# Patient Record
Sex: FEMALE | Race: WHITE | HISPANIC OR LATINO | Employment: FULL TIME | URBAN - METROPOLITAN AREA
[De-identification: names, ages, dates, MRNs, and addresses within clinical notes are randomized per-mention and may not be internally consistent; named-entity substitution may affect disease eponyms.]

---

## 2017-04-25 ENCOUNTER — HOSPITAL ENCOUNTER (EMERGENCY)
Facility: MEDICAL CENTER | Age: 22
End: 2017-04-25
Attending: EMERGENCY MEDICINE
Payer: MEDICAID

## 2017-04-25 VITALS
SYSTOLIC BLOOD PRESSURE: 106 MMHG | HEART RATE: 77 BPM | RESPIRATION RATE: 16 BRPM | TEMPERATURE: 97.2 F | BODY MASS INDEX: 29.54 KG/M2 | DIASTOLIC BLOOD PRESSURE: 62 MMHG | WEIGHT: 194.89 LBS | HEIGHT: 68 IN | OXYGEN SATURATION: 98 %

## 2017-04-25 DIAGNOSIS — R51.9 NONINTRACTABLE HEADACHE, UNSPECIFIED CHRONICITY PATTERN, UNSPECIFIED HEADACHE TYPE: ICD-10-CM

## 2017-04-25 DIAGNOSIS — N93.9 VAGINAL BLEEDING: ICD-10-CM

## 2017-04-25 LAB
APPEARANCE UR: CLEAR
BILIRUB UR QL STRIP.AUTO: NEGATIVE
COLOR UR: YELLOW
CULTURE IF INDICATED INDCX: NO UA CULTURE
EPI CELLS #/AREA URNS HPF: NORMAL /HPF
GLUCOSE UR STRIP.AUTO-MCNC: NEGATIVE MG/DL
HCG UR QL: NEGATIVE
KETONES UR STRIP.AUTO-MCNC: NEGATIVE MG/DL
LEUKOCYTE ESTERASE UR QL STRIP.AUTO: NEGATIVE
MICRO URNS: ABNORMAL
NITRITE UR QL STRIP.AUTO: NEGATIVE
PH UR STRIP.AUTO: 6.5 [PH]
PROT UR QL STRIP: NEGATIVE MG/DL
RBC # URNS HPF: NORMAL /HPF
RBC UR QL AUTO: ABNORMAL
SP GR UR STRIP.AUTO: 1.03
WBC #/AREA URNS HPF: NORMAL /HPF

## 2017-04-25 PROCEDURE — 700111 HCHG RX REV CODE 636 W/ 250 OVERRIDE (IP): Performed by: EMERGENCY MEDICINE

## 2017-04-25 PROCEDURE — 81001 URINALYSIS AUTO W/SCOPE: CPT

## 2017-04-25 PROCEDURE — 96375 TX/PRO/DX INJ NEW DRUG ADDON: CPT

## 2017-04-25 PROCEDURE — 96374 THER/PROPH/DIAG INJ IV PUSH: CPT

## 2017-04-25 PROCEDURE — 700105 HCHG RX REV CODE 258: Performed by: EMERGENCY MEDICINE

## 2017-04-25 PROCEDURE — 99284 EMERGENCY DEPT VISIT MOD MDM: CPT

## 2017-04-25 PROCEDURE — 81025 URINE PREGNANCY TEST: CPT

## 2017-04-25 RX ORDER — KETOROLAC TROMETHAMINE 30 MG/ML
30 INJECTION, SOLUTION INTRAMUSCULAR; INTRAVENOUS ONCE
Status: COMPLETED | OUTPATIENT
Start: 2017-04-25 | End: 2017-04-25

## 2017-04-25 RX ORDER — SODIUM CHLORIDE 9 MG/ML
1000 INJECTION, SOLUTION INTRAVENOUS ONCE
Status: COMPLETED | OUTPATIENT
Start: 2017-04-25 | End: 2017-04-25

## 2017-04-25 RX ORDER — IBUPROFEN 600 MG/1
600 TABLET ORAL EVERY 8 HOURS PRN
Qty: 15 TAB | Refills: 0 | Status: SHIPPED | OUTPATIENT
Start: 2017-04-25 | End: 2022-08-08

## 2017-04-25 RX ORDER — DIPHENHYDRAMINE HYDROCHLORIDE 50 MG/ML
25 INJECTION INTRAMUSCULAR; INTRAVENOUS ONCE
Status: COMPLETED | OUTPATIENT
Start: 2017-04-25 | End: 2017-04-25

## 2017-04-25 RX ORDER — ONDANSETRON 2 MG/ML
4 INJECTION INTRAMUSCULAR; INTRAVENOUS ONCE
Status: COMPLETED | OUTPATIENT
Start: 2017-04-25 | End: 2017-04-25

## 2017-04-25 RX ADMIN — DIPHENHYDRAMINE HYDROCHLORIDE 25 MG: 50 INJECTION, SOLUTION INTRAMUSCULAR; INTRAVENOUS at 21:31

## 2017-04-25 RX ADMIN — ONDANSETRON 4 MG: 2 INJECTION INTRAMUSCULAR; INTRAVENOUS at 21:32

## 2017-04-25 RX ADMIN — SODIUM CHLORIDE 1000 ML: 9 INJECTION, SOLUTION INTRAVENOUS at 21:31

## 2017-04-25 RX ADMIN — KETOROLAC TROMETHAMINE 30 MG: 30 INJECTION, SOLUTION INTRAMUSCULAR; INTRAVENOUS at 21:31

## 2017-04-25 ASSESSMENT — LIFESTYLE VARIABLES: DO YOU DRINK ALCOHOL: NO

## 2017-04-25 ASSESSMENT — PAIN SCALES - GENERAL: PAINLEVEL_OUTOF10: 8

## 2017-04-25 NOTE — ED AVS SNAPSHOT
Home Care Instructions                                                                                                                Shauna Vail   MRN: [id number]    Department:  [facility], Emergency Dept   Date of Visit:  4/25/2017            [facility], Emergency Dept    [address] [place] [id number]    Phone:  [phone]      You were seen by     Nissa Rodríguez D.O.      Your Diagnosis Was     Nonintractable headache, unspecified chronicity pattern, unspecified headache type     R51       These are the medications you received during your hospitalization from 04/25/2017 1903 to 04/25/2017 2330     Date/Time Order Dose Route Action    04/25/2017 2131 NS infusion 1,000 mL 1,000 mL Intravenous New Bag    04/25/2017 2131 ketorolac (TORADOL) injection 30 mg Intravenous Given    04/25/2017 2132 ondansetron (ZOFRAN) syringe/vial injection 4 mg 4 mg Intravenous Given    04/25/2017 2131 diphenhydrAMINE (BENADRYL) injection 25 mg 25 mg Intravenous Given      Follow-up Information     1. Follow up with [facility]. Schedule an appointment as soon as possible for a visit in 3 days.    Contact information    [address] [place] [address] [phone]        2. Follow up with [facility], Emergency Dept.    Specialty:  Emergency Medicine    Why:  Please return to the ED with any worsening signs or symptoms including worsening headache, inability to tolerate anything by mouth, or passing out    Contact information    [address] [place] [id number] [phone]      Medication Information     Review all of your home medications and newly ordered medications with your primary doctor and/or pharmacist as soon as possible. Follow medication instructions as directed by your doctor and/or pharmacist.     Please keep your complete medication list with you and share with your physician. Update the information when medications are  discontinued, doses are changed, or new medications (including over-the-counter products) are added; and carry medication information at all times in the event of emergency situations.               Medication List      START taking these medications        Instructions    Morning Afternoon Evening Bedtime    ibuprofen 600 MG Tabs   Commonly known as:  MOTRIN        Take 1 Tab by mouth every 8 hours as needed.   Dose:  600 mg                          ASK your doctor about these medications        Instructions    Morning Afternoon Evening Bedtime     MG Caps        Take 100 mg by mouth 2 times a day as needed for Constipation.   Dose:  100 mg                        ferrous sulfate 325 (65 FE) MG tablet        Take 1 Tab by mouth 2 times a day, with meals.   Dose:  325 mg                        PRENATAL 1 PO        Take  by mouth.                        prenatal plus vitamin 27-1 MG Tabs tablet        Take 1 Tab by mouth every morning.   Dose:  1 Tab                             Where to Get Your Medications      You can get these medications from any pharmacy     Bring a paper prescription for each of these medications    - ibuprofen 600 MG Tabs            Procedures and tests performed during your visit     IV [place]    NURSING COMMUNICATION    POC URINE PREGNANCY    URINALYSIS CULTURE, IF INDICATED    URINE MICROSCOPIC (W/UA)        Discharge Instructions         Dolor de liseth general sin causa   (General Headache Without Cause)   Un dolor de liseth en general es un dolor o malestar que se siente en la maddie de la liseth o del ernestina. Se desconocen las causas.   CUIDADOS EN EL HOGAR   · Cumpla con los controles médicos según las indicaciones.  · [place] sólo los medicamentos que le haya indicado el médico.  · Cuando sienta dolor de liseth acuéstese en un cuarto oscuro y tranquilo.  · Lleve un registro diario para averiguar si ciertas cosas provocan jones de liseth. Por ejemplo, escriba:  · Lo que come y  andrew.  · Cuánto tiempo duerme.  · Todo cambio en la dieta o medicamentos.  · Relájese recibiendo masajes o paul otras actividades relajantes.  · Coloque hielo o calor en la liseth y el ernestina valentin lo indique zamora médico.  · DISMINUYA EL NIVEL DE ESTRÉS  · Siéntase con la espalda recta. No apriete los músculos (tensione).  · Si fuma, deje de hacerlo.  · Kelle menos alcohol.  · Consuma menos cafeína o deje de tomarla.  · Coma y duerma en horarios regulares.  · Duerma entre 7 y 9 horas o valentin le indique zamora médico.  · Mantenga las luces tenues si le molesta las luces brillantes o adelaida jones de liseth empeoran.  SOLICITE AYUDA DE INMEDIATO SI:   · El dolor de liseth empeora.  · Tiene fiebre.  · Presenta rigidez en el ernestina.  · Tiene dificultad para amor.  · Adelaida músculos están débiles o pierde el control muscular.  · Pierde equilibrio o tiene problemas para caminar.  · Siente que se desvanece (debilidad) o se desmaya.  · Tiene síntomas intensos que son diferentes a los primeros síntomas.  · Tiene problemas con los medicamentos que le recetó zamora médico.  · El medicamento no le hace efecto.  · Siente que el dolor de liseth es diferente a otros jones de liseth.  · Tiene malestar estomacal (náuseas) o (vómitos).     Esta información no tiene valentin fin reemplazar el consejo del médico. Asegúrese de hacerle al médico cualquier pregunta que tenga.     Document Released: 03/11/2013 Document Revised: 05/03/2016  [email or web address].COM Interactive Patient Education ©2016 Elsevier Inc.    Hemorragia uterina disfuncional  (Dysfunctional Uterine Bleeding)  Normalmente, los períodos menstruales comienzan en las jóvenes de entre 11 y 17 años. Un ciclo o período menstrual puede repetirse entre los 23 jose y los 35 días y dura entre 1 y 7 días. Entre los 12 y los 14 días antes de que comience el ciclo menstrual, se produce la ovulación (los ovarios producen óvulos). Cuando cuente los periodos, hágalo desde el primer día del comienzo de la hemorragia del  período anterior hasta el primer día de la hemorragia del período siguiente.  La hemorragia uterina disfuncional (anormal) es diferente del período menstrual normal. Los períodos pueden comenzar antes o después que lo habitual. Pueden ser menos abundantes, presentar coágulos, o ser más abundantes. Puede tener hemorragias entre los períodos o saltear un período o más. Puede tener hemorragia luego de mantener relaciones sexuales, después de la menopausia o faltarle el período menstrual.  CAUSAS  · Embarazo (normal, aborto espontáneo, embarazo ectópico).  · DIU (dispositivo intrauterino, anticonceptivo)  · Píldoras anticonceptivas  · Tratamiento hormonal  · La menopausia  · Infección en el cervix.  · Problemas de coagulación.  · Infecciones de la superficie interna del útero.  · Endometriosis: la superficie interna del útero se desarrolla en la pelvis y otros órganos femeninos.  · Adherencias (tejido cicatrizal) dentro del útero.  · Obesidad o severa pérdida de peso.  · Pólipos en el útero.  · Cáncer en el cérvix, la vagina o el útero.  · Quiste de ovarios o Síndrome ovárico poliquístico.  · Otras enfermedades (diabetes, enfermedad de la tiroides, etc).  · Fibromas en el útero (tumores no cancerosos).  · Problemas con las hormonas femeninas.  · Hiperplasia del endometrio: capa gruesa y células agrandadas dentro del útero.  · Medicamentos que interfieren con la ovulación.  · Radiación en la pelvis o el abdomen.  · Quimioterapia.  DIAGNÓSTICO  · El médico analizará la historia de lolita períodos menstruales, los medicamentos que dalila, los cambios en el peso corporal, el estrés de la angelita diaria y cualquier problema médico que tenga.  · El médico realizará un examen físico, incluyendo un examen pélvico.  · También le solicitará análisis para completar el diagnóstico. Ellos son:  ¨ Papanicolau.  ¨ Análisis de charles.  ¨ Cultivos para descartar infecciones.  ¨ Tomografía  computada.  ¨ Ultrasonido.  ¨ Histeroscopía.  ¨ Laparoscopía.  ¨ Resonancia magnética.  ¨ Histerosalpingografía.  ¨ Dilatación y curetaje.  ¨ Biopsia de endometrio.  TRATAMIENTO  El tratamiento dependerá de la causa de la hemorragia.. El tratamiento consiste en:  · Observación de los períodos menstruales armando algunos meses.  · Prescripción de medicamentos valentin:  ¨ Antibióticos:  ¨ Hormonas.  ¨ Píldoras anticonceptivas  · Retirar el DIU (dispositivo intrauterino, anticonceptivo).  · Cirugía:  ¨ Dilatación y curetaje (raspado y remoción de tejido de la maddie interna del útero).  ¨ Laparoscopía (examen del interior del abdomen con un tubo con saulo).  ¨ Ablación uterina (destrucción de la membrana que cubre el útero con corriente eléctrica, virgie láser, congelamiento o calor ).  ¨ Histeroscopía (examen del ernestina y el útero con un tubo con saulo).  ¨ Histerectomía (extirpación del útero).  INSTRUCCIONES PARA EL CUIDADO DOMICILIARIO  · Si el profesional que la asiste le prescribe medicamentos, tómelos toni valentin se le indicó. No cambie ni reemplace medicamentos sin consultarlo con el profesional.  · Las hemorragias de larga duración pueden traen valentin consecuencia un déficit de janelle. El profesional que lo asiste podrá prescribirle janelle. [place] ayuda a reponer el janelle que el organismo pierde luego de doni hemorragia abundante. [place] los medicamentos toni valentin se le indicó.  · No tome aspirina o medicamentos que la contengan u desde doni semana antes del período menstrual ni armando el mismo. La aspirina puede hacer que la hemorragia empeore.  · Si necesita cambiar el apósito o el tampón mas de doni vez cada 2 horas, permanezca en cama con los pies elevados y aplique doni compresa fría en la maddie baja del abdomen. Descanse todo lo que pueda hasta que la hemorragia se detenga.  · Consuma alimentos balanceados. Coma alimentos ricos en janelle. Por ejemplo:  ¨ Vegetales verdes frescos.  ¨ Cereales integrales y cereales y panes con  salvado.  ¨ Huevos.  ¨ Suellen.  ¨ Hígado.  · No trate de perder peso hasta que la hemorragia anormal se detenga y los niveles de janelle en la charles vuelvan a la normalidad. No levante pesos de más de 10 libras (4.5 kg.) ni realice actividades extenuantes mientras tenga la hemorragia.  · [place] un par de meses tome nota en un almanaque y rom el comienzo y el fin de zamora período menstrual y el tipo de sangrado (escaso, medio, abundante, gotas, coágulos o falta de período). El objetivo es que zamora médico evalúe mejor el problema.  SOLICITE ATENCIÓN MÉDICA SI:  · Debe cambiar el apósito o el tampón más de doni vez cada hora.  · Se siente mareada o débil.  · Tiene algún problema que pueda relacionarse con el medicamento que está tomando.  · Siente dolor.  · Desea retirar zamora DIU.  · Quiere suspender o cambiar lolita píldoras anticonceptivas u hormonas.  · Tiene algún tipo de hemorragia anormal mencionada anteriormente.  · Tiene más de 16 años y aún no ha tenido zamora período menstrual.  · Tiene 55 años y aún tiene períodos menstruales.  · Tiene alguno de los síntomas mencionados anteriormente.  · Aparece doni erupción cutánea.  SOLICITE ATENCIÓN MÉDICA DE INMEDIATO SI:  · La temperatura oral se eleva sin motivo por encima de 38,9° C (102° F).  · Comienza a sentir escalofríos.  · Debe cambiar el apósito o el tampón más de doni vez cada hora.  · Siente dolor abdominal.  · Pierde el conocimiento, se desmaya.     Esta información no tiene valentin fin reemplazar el consejo del médico. Asegúrese de hacerle al médico cualquier pregunta que tenga.     Document Released: 09/27/2006 Document Revised: 09/11/2013  Elsevier Interactive Patient Education ©2016 Elsevier Inc.              Patient Information     Patient Information    Following emergency treatment: all patient requiring follow-up care must return either to a private physician or a clinic if your condition worsens before you are able to obtain further medical attention, please  return to the emergency room.     Billing Information    At [facility], we work to make the billing process streamlined for our patients.  Our Representatives are here to answer any questions you may have regarding your hospital bill.  If you have insurance coverage and have supplied your insurance information to us, we will submit a claim to your insurer on your behalf.  Should you have any questions regarding your bill, we can be reached online or by phone as follows:  Online: You are able pay your bills online or live chat with our representatives about any billing questions you may have. We are here to help Monday - Friday from 8:00am to 7:30pm and 9:00am - 12:00pm on Saturdays.  Please visit [email or web address]  for more information.   Phone:  [phone] or [phone]    Please note that your emergency physician, surgeon, pathologist, radiologist, anesthesiologist, and other specialists are not employed by [facility] and will therefore bill separately for their services.  Please contact them directly for any questions concerning their bills at the numbers below:     Emergency Physician Services:  [phone]  [facility]:  [phone]  [facility]:  [phone]  [facility]:  [phone]    1. Your final bill may vary from the amount quoted upon discharge if all procedures are not complete at that time, or if your doctor has additional procedures of which we are not aware. You will receive an additional bill if you return to the Emergency Department at [facility] for suture removal regardless of the facility of which the sutures were placed.     2. Please arrange for settlement of this account at the emergency registration.    3. All self-pay accounts are due in full at the time of treatment.  If you are unable to meet this obligation then payment is expected within 4-5 days.     4. If you have had radiology studies  (CT, X-ray, Ultrasound, MRI), you have received a preliminary result during your emergency department visit. Please contact the radiology department [phone] to receive a copy of your final result. Please discuss the Final result with your primary physician or with the follow up physician provided.     Crisis Hotline:  [place] Crisis Hotline:  0-412-XXCAYZO or 1-727.308.5818  [place] Crisis Hotline:    1-979.403.7147 or 364-090-2181         ED Discharge Follow Up Questions    1. In order to provide you with very good care, we would like to follow up with a phone call in the next few days.  May we have your permission to contact you?     YES /  NO    2. What is the best phone number to call you? (       )_____-__________    3. What is the best time to call you?      Morning  /  Afternoon  /  Evening                   Patient Signature:  ____________________________________________________________    Date:  ____________________________________________________________

## 2017-04-25 NOTE — ED AVS SNAPSHOT
[email or web address] Access Code: [email or web address]  Expires: 5/25/2017 11:30 PM    Your email address is not on file at [facility] [employer].  Email Addresses are required for you to sign up for [email or web address], please contact [phone] to verify your personal information and to provide your email address prior to attempting to register for [email or web address].    Shauna Vail  [address]  [place] [address]    [employer]  A secure, online tool to manage your health information     [employer]’s [email or web address].com® is a secure, online tool that connects you to your personalized health information from the privacy of your home -- day or night - making it very easy for you to manage your healthcare. Once the activation process is completed, you can even access your medical information using the [email or web address].com david, which is available for free in the Apple David store or Google Play store.     To learn more about [email or web address], visit [email or web address]    There are two levels of access available (as shown below):   My Chart Features  [facility] Primary Care Doctor [facility]  Specialists [facility]  Urgent  Care [facility] Primary Care Doctor   Email your healthcare team securely and privately 24/7 X X X    Manage appointments: schedule your next appointment; view details of past/upcoming appointments X      Request prescription refills. X      View recent personal medical records, including lab and immunizations X X X X   View health record, including health history, allergies, medications X X X X   Read reports about your outpatient visits, procedures, consult and ER notes X X X X   See your discharge summary, which is a recap of your hospital and/or ER visit that includes your diagnosis, lab results, and care plan X X  X     How to register for [employer]:  Once your e-mail address has been verified, follow the following steps to sign up for [employer].     1. Go to  [email or web address]  2. Click on the Sign Up Now box, which takes you to the New Member Sign Up page. You  will need to provide the following information:  a. Enter your [employer] Access Code exactly as it appears at the top of this page. (You will not need to use this code after you’ve completed the sign-up process. If you do not sign up before the expiration date, you must request a new code.)   b. Enter your date of birth.   c. Enter your home email address.   d. Click Submit, and follow the next screen’s instructions.  3. Create a Education Everytimet ID. This will be your [employer] login ID and cannot be changed, so think of one that is secure and easy to remember.  4. Create a [employer] password. You can change your password at any time.  5. Enter your Password Reset Question and Answer. This can be used at a later time if you forget your password.   6. Enter your e-mail address. This allows you to receive e-mail notifications when new information is available in [employer]hen[employer]ner.  7. Click Sign Up. You can now view your health information.    For assistance activating your [employer] account, call [phone]

## 2017-04-25 NOTE — ED AVS SNAPSHOT
4/25/2017    Shauna Vail  [address]  [place] [address]    Dear Shauna:    [facility] wants to ensure your discharge home is safe and you or your loved ones have had all of your questions answered regarding your care after you leave the hospital.    Below is a list of resources and contact information should you have any questions regarding your hospital stay, follow-up instructions, or active medical symptoms.    Questions or Concerns Regarding… Contact   Medical Questions Related to Your Discharge  (7 days a week, 8am-5pm) Contact a Nurse Care Coordinator   [phone]   Medical Questions Not Related to Your Discharge  (24 hours a day / 7 days a week)  Contact the Nurse Health Line   [phone]    Medications or Discharge Instructions Refer to your discharge packet   or contact your [facility] Primary Care Provider   [phone]   Follow-up Appointment(s) Schedule your appointment via [facility]   or contact Scheduling [phone]   Billing Review your statement via [facility]  or contact Billing [phone]   Medical Records Review your records via [facility]   or contact Medical Records [phone]     You may receive a telephone call within two days of discharge. This call is to make certain you understand your discharge instructions and have the opportunity to have any questions answered. You can also easily access your medical information, test results and upcoming appointments via the [facility] free online health management tool. You can learn more and sign up at Membersuite/[facility]. For assistance setting up your [facility] account, please call [phone].    Once again, we want to ensure your discharge home is safe and that you have a clear understanding of any next steps in your care. If you have any questions or concerns, please do not hesitate to contact us, we are here for you. Thank you for choosing [facility] for your healthcare needs.    Sincerely,    Your [facility] Healthcare Team

## 2017-04-26 NOTE — DISCHARGE INSTRUCTIONS
Dolor de liseth general sin causa   (General Headache Without Cause)   Un dolor de liseth en general es un dolor o malestar que se siente en la maddie de la liseth o del ernestina. Se desconocen las causas.   CUIDADOS EN EL HOGAR   · Cumpla con los controles médicos según las indicaciones.  · [place] sólo los medicamentos que le haya indicado el médico.  · Cuando sienta dolor de liseth acuéstese en un cuarto oscuro y tranquilo.  · Lleve un registro diario para averiguar si ciertas cosas provocan jones de liseth. Por ejemplo, escriba:  · Lo que come y andrew.  · Cuánto tiempo duerme.  · Todo cambio en la dieta o medicamentos.  · Relájese recibiendo masajes o paul otras actividades relajantes.  · Coloque hielo o calor en la liseth y el ernestina valentin lo indique zamora médico.  · DISMINUYA EL NIVEL DE ESTRÉS  · Siéntase con la espalda recta. No apriete los músculos (tensione).  · Si fuma, deje de hacerlo.  · Kelle menos alcohol.  · Consuma menos cafeína o deje de tomarla.  · Coma y duerma en horarios regulares.  · Duerma entre 7 y 9 horas o valentin le indique zamora médico.  · Mantenga las luces tenues si le molesta las luces brillantes o adelaida jones de liseth empeoran.  SOLICITE AYUDA DE INMEDIATO SI:   · El dolor de liseth empeora.  · Tiene fiebre.  · Presenta rigidez en el ernestina.  · Tiene dificultad para amor.  · Adelaida músculos están débiles o pierde el control muscular.  · Pierde equilibrio o tiene problemas para caminar.  · Siente que se desvanece (debilidad) o se desmaya.  · Tiene síntomas intensos que son diferentes a los primeros síntomas.  · Tiene problemas con los medicamentos que le recetó zamora médico.  · El medicamento no le hace efecto.  · Siente que el dolor de liseth es diferente a otros jones de liseth.  · Tiene malestar estomacal (náuseas) o (vómitos).     Esta información no tiene valentin fin reemplazar el consejo del médico. Asegúrese de hacerle al médico cualquier pregunta que tenga.     Document Released: 03/11/2013  Document Revised: 05/03/2016  Intense Interactive Patient Education ©2016 [employer]    Hemorragia uterina disfuncional  (Dysfunctional Uterine Bleeding)  Normalmente, los períodos menstruales comienzan en las jóvenes de entre 11 y 17 años. Un ciclo o período menstrual puede repetirse entre los 23 jose y los 35 días y dura entre 1 y 7 días. Entre los 12 y los 14 días antes de que comience el ciclo menstrual, se produce la ovulación (los ovarios producen óvulos). Cuando cuente los periodos, hágalo desde el primer día del comienzo de la hemorragia del período anterior hasta el primer día de la hemorragia del período siguiente.  La hemorragia uterina disfuncional (anormal) es diferente del período menstrual normal. Los períodos pueden comenzar antes o después que lo habitual. Pueden ser menos abundantes, presentar coágulos, o ser más abundantes. Puede tener hemorragias entre los períodos o saltear un período o más. Puede tener hemorragia luego de mantener relaciones sexuales, después de la menopausia o faltarle el período menstrual.  CAUSAS  · Embarazo (normal, aborto espontáneo, embarazo ectópico).  · DIU (dispositivo intrauterino, anticonceptivo)  · Píldoras anticonceptivas  · Tratamiento hormonal  · La menopausia  · Infección en el cervix.  · Problemas de coagulación.  · Infecciones de la superficie interna del útero.  · Endometriosis: la superficie interna del útero se desarrolla en la pelvis y otros órganos femeninos.  · Adherencias (tejido cicatrizal) dentro del útero.  · Obesidad o severa pérdida de peso.  · Pólipos en el útero.  · Cáncer en el cérvix, la vagina o el útero.  · Quiste de ovarios o Síndrome ovárico poliquístico.  · Otras enfermedades (diabetes, enfermedad de la tiroides, etc).  · Fibromas en el útero (tumores no cancerosos).  · Problemas con las hormonas femeninas.  · Hiperplasia del endometrio: capa gruesa y células agrandadas dentro del útero.  · Medicamentos que interfieren con la  ovulación.  · Radiación en la pelvis o el abdomen.  · Quimioterapia.  DIAGNÓSTICO  · El médico analizará la historia de lolita períodos menstruales, los medicamentos que dalila, los cambios en el peso corporal, el estrés de la angelita diaria y cualquier problema médico que tenga.  · El médico realizará un examen físico, incluyendo un examen pélvico.  · También le solicitará análisis para completar el diagnóstico. Ellos son:  ¨ Papanicolau.  ¨ Análisis de charles.  ¨ Cultivos para descartar infecciones.  ¨ Tomografía computada.  ¨ Ultrasonido.  ¨ Histeroscopía.  ¨ Laparoscopía.  ¨ Resonancia magnética.  ¨ Histerosalpingografía.  ¨ Dilatación y curetaje.  ¨ Biopsia de endometrio.  TRATAMIENTO  El tratamiento dependerá de la causa de la hemorragia.. El tratamiento consiste en:  · Observación de los períodos menstruales armando algunos meses.  · Prescripción de medicamentos valentin:  ¨ Antibióticos:  ¨ Hormonas.  ¨ Píldoras anticonceptivas  · Retirar el DIU (dispositivo intrauterino, anticonceptivo).  · Cirugía:  ¨ Dilatación y curetaje (raspado y remoción de tejido de la maddie interna del útero).  ¨ Laparoscopía (examen del interior del abdomen con un tubo con saulo).  ¨ Ablación uterina (destrucción de la membrana que cubre el útero con corriente eléctrica, virgie láser, congelamiento o calor ).  ¨ Histeroscopía (examen del ernestina y el útero con un tubo con saulo).  ¨ Histerectomía (extirpación del útero).  INSTRUCCIONES PARA EL CUIDADO DOMICILIARIO  · Si el profesional que la asiste le prescribe medicamentos, tómelos tnoi valentin se le indicó. No cambie ni reemplace medicamentos sin consultarlo con el profesional.  · Las hemorragias de larga duración pueden traen valentin consecuencia un déficit de janelle. El profesional que lo asiste podrá prescribirle janelle. [place] ayuda a reponer el janelle que el organismo pierde luego de doni hemorragia abundante. [place] los medicamentos toni valentin se le indicó.  · No tome aspirina o medicamentos que la  contengan u desde doni semana antes del período menstrual ni armando el mismo. La aspirina puede hacer que la hemorragia empeore.  · Si necesita cambiar el apósito o el tampón mas de doni vez cada 2 horas, permanezca en cama con los pies elevados y aplique doni compresa fría en la maddie baja del abdomen. Descanse todo lo que pueda hasta que la hemorragia se detenga.  · Consuma alimentos balanceados. Coma alimentos ricos en janelle. Por ejemplo:  ¨ Vegetales verdes frescos.  ¨ Cereales integrales y cereales y panes con salvado.  ¨ Huevos.  ¨ Suellen.  ¨ Hígado.  · No trate de perder peso hasta que la hemorragia anormal se detenga y los niveles de janelle en la charles vuelvan a la normalidad. No levante pesos de más de 10 libras (4.5 kg.) ni realice actividades extenuantes mientras tenga la hemorragia.  · Armando un par de meses tome nota en un almanaque y rom el comienzo y el fin de zamora período menstrual y el tipo de sangrado (escaso, medio, abundante, gotas, coágulos o falta de período). El objetivo es que zamora médico evalúe mejor el problema.  SOLICITE ATENCIÓN MÉDICA SI:  · Debe cambiar el apósito o el tampón más de doni vez cada hora.  · Se siente mareada o débil.  · Tiene algún problema que pueda relacionarse con el medicamento que está tomando.  · Siente dolor.  · Desea retirar zamora DIU.  · Quiere suspender o cambiar lolita píldoras anticonceptivas u hormonas.  · Tiene algún tipo de hemorragia anormal mencionada anteriormente.  · Tiene más de 16 años y aún no ha tenido zamora período menstrual.  · Tiene 55 años y aún tiene períodos menstruales.  · Tiene alguno de los síntomas mencionados anteriormente.  · Aparece doni erupción cutánea.  SOLICITE ATENCIÓN MÉDICA DE INMEDIATO SI:  · La temperatura oral se eleva sin motivo por encima de 38,9° C (102° F).  · Comienza a sentir escalofríos.  · Debe cambiar el apósito o el tampón más de doni vez cada hora.  · Siente dolor abdominal.  · Pierde el conocimiento, se desmaya.     Esta  información no tiene valentin fin reemplazar el consejo del médico. Asegúrese de hacerle al médico cualquier pregunta que tenga.     Document Released: 09/27/2006 Document Revised: 09/11/2013  Elsevier Interactive Patient Education ©2016 [employer] Inc.

## 2017-04-26 NOTE — ED NOTES
Patient given discharge paperwork and verbalized understanding. Patient out of ED ambulatory with significant other. Patient in stable condition and vitals stable and no distress noted.

## 2017-04-26 NOTE — ED NOTES
Pt ambulatory to triage; primarily Erit[place]an speaking,  and baby with patient:    Chief Complaint   Patient presents with   • Headache     intermittent HA and eye pain x1 week.   • Nausea/Vomiting/Diarrhea     x1 week, unable to keep food down for 1 week.     • Vaginal Bleeding     fould smell, white discharge, blood x 1 month.     Explained wait time and triage process to pt. Pt placed back out in lobby, told to notify ED tech or triage RN of any changes.

## 2017-04-26 NOTE — ED NOTES
Assumed care of patient. Patient complains of headache with photophobia, nausea and vomiting x one week.  Patient alert and oriented x 4. Patient denies any other complaints at this time. Patient side rails up x 1 and call bell within reach.

## 2018-11-28 ENCOUNTER — OFFICE VISIT (OUTPATIENT)
Dept: URGENT CARE | Facility: CLINIC | Age: 23
End: 2018-11-28
Payer: COMMERCIAL

## 2018-11-28 VITALS
WEIGHT: 250 LBS | HEIGHT: 67 IN | HEART RATE: 85 BPM | BODY MASS INDEX: 39.24 KG/M2 | OXYGEN SATURATION: 96 % | TEMPERATURE: 98.5 F | SYSTOLIC BLOOD PRESSURE: 104 MMHG | RESPIRATION RATE: 16 BRPM | DIASTOLIC BLOOD PRESSURE: 74 MMHG

## 2018-11-28 DIAGNOSIS — H66.002 ACUTE SUPPURATIVE OTITIS MEDIA OF LEFT EAR WITHOUT SPONTANEOUS RUPTURE OF TYMPANIC MEMBRANE, RECURRENCE NOT SPECIFIED: ICD-10-CM

## 2018-11-28 DIAGNOSIS — H60.502 ACUTE OTITIS EXTERNA OF LEFT EAR, UNSPECIFIED TYPE: ICD-10-CM

## 2018-11-28 PROCEDURE — 99203 OFFICE O/P NEW LOW 30 MIN: CPT | Performed by: PHYSICIAN ASSISTANT

## 2018-11-28 RX ORDER — AMOXICILLIN AND CLAVULANATE POTASSIUM 875; 125 MG/1; MG/1
TABLET, FILM COATED ORAL
Refills: 0 | COMMUNITY
Start: 2018-10-29 | End: 2022-08-08

## 2018-11-28 RX ORDER — AMOXICILLIN AND CLAVULANATE POTASSIUM 875; 125 MG/1; MG/1
1 TABLET, FILM COATED ORAL 2 TIMES DAILY
Qty: 14 TAB | Refills: 0 | Status: SHIPPED | OUTPATIENT
Start: 2018-11-28 | End: 2018-12-05

## 2018-11-28 NOTE — LETTER
November 28, 2018         Patient: Shauna Vail   YOB: 1995   Date of Visit: 11/28/2018           To Whom it May Concern:    Shauna Vail was seen in my clinic on 11/28/2018. She may return to work on 11/29/2018    If you have any questions or concerns, please don't hesitate to call.        Sincerely,           Kwaku Palm P.A.-C.  Electronically Signed

## 2018-11-29 ASSESSMENT — ENCOUNTER SYMPTOMS
SPUTUM PRODUCTION: 0
NECK PAIN: 0
SORE THROAT: 0
RHINORRHEA: 0
HEADACHES: 1
SHORTNESS OF BREATH: 0
EYE REDNESS: 0
VOMITING: 0
DIARRHEA: 0
FEVER: 0
DIZZINESS: 0
COUGH: 1
TINGLING: 0
EYE DISCHARGE: 0
WHEEZING: 0
CHILLS: 0
MYALGIAS: 0

## 2018-11-29 NOTE — PROGRESS NOTES
Subjective:      Shauna Vail is a 22 y.o. female who presents with Otalgia ((L) ear x 1 month and not getting better. pt states (L) side of mouth now hurts from it)            Patient is a pleasant 22-year-old female who presents to urgent care with worsening left-sided ear pain for the past few days.  Patient was previously evaluated more than a month ago for similar symptoms of which after the oral antibiotic she was placed on slightly helped with symptoms however over the last few days they have returned and now patient is with notable discharge from the ear.  Patient is with her significant other today who provides majority of history and translation.      Otalgia    There is pain in the left ear. This is a new problem. The current episode started in the past 7 days. The problem occurs constantly. The problem has been gradually worsening. There has been no fever. The pain is at a severity of 6/10. The pain is moderate. Associated symptoms include coughing, ear discharge, headaches and hearing loss. Pertinent negatives include no diarrhea, neck pain, rash, rhinorrhea, sore throat or vomiting. Treatments tried: Prior ABX, Tylenol.  The treatment provided mild relief.       Review of Systems   Constitutional: Negative for chills, fever and malaise/fatigue.   HENT: Positive for congestion, ear discharge, ear pain and hearing loss. Negative for rhinorrhea and sore throat.    Eyes: Negative for discharge and redness.   Respiratory: Positive for cough. Negative for sputum production, shortness of breath and wheezing.    Cardiovascular: Negative for chest pain.   Gastrointestinal: Negative for diarrhea and vomiting.   Genitourinary: Negative for dysuria and urgency.   Musculoskeletal: Negative for myalgias and neck pain.   Skin: Negative for itching and rash.   Neurological: Positive for headaches. Negative for dizziness and tingling.   All other systems reviewed and are negative.         Objective:     /74 (BP  "Location: Left arm, Patient Position: Sitting, BP Cuff Size: Large adult)   Pulse 85   Temp 36.9 °C (98.5 °F) (Temporal)   Resp 16   Ht 1.702 m (5' 7\")   Wt 113.4 kg (250 lb)   SpO2 96%   BMI 39.16 kg/m²    PMH:  has a past medical history of Anxiety.  MEDS:   Current Outpatient Prescriptions:   •  amoxicillin-clavulanate (AUGMENTIN) 875-125 MG Tab, TK 1 T PO BID, Disp: , Rfl: 0  •  neomycin sulf/polymyx B sulf/HC soln (CORTISPORIN HC SOL) 3.5-47370-4 Solution, Place 4 Drops in ear 4 times a day for 7 days., Disp: 1 Bottle, Rfl: 0  •  amoxicillin-clavulanate (AUGMENTIN) 875-125 MG Tab, Take 1 Tab by mouth 2 times a day for 7 days., Disp: 14 Tab, Rfl: 0  •  ibuprofen (MOTRIN) 600 MG Tab, Take 1 Tab by mouth every 8 hours as needed., Disp: 15 Tab, Rfl: 0  •  ferrous sulfate 325 (65 FE) MG tablet, Take 1 Tab by mouth 2 times a day, with meals., Disp: 30 Tab, Rfl: 1  •  docusate sodium 100 MG Cap, Take 100 mg by mouth 2 times a day as needed for Constipation., Disp: 60 Cap, Rfl: 1  •  prenatal plus vitamin (STUARTNATAL 1+1) 27-1 MG Tab tablet, Take 1 Tab by mouth every morning., Disp: 30 Tab, Rfl: 11  •  Prenatal MV-Min-Fe Fum-FA-DHA (PRENATAL 1 PO), Take  by mouth., Disp: , Rfl:   ALLERGIES:   Allergies   Allergen Reactions   • Acetaminophen      SURGHX: No past surgical history on file.  SOCHX:  reports that she has quit smoking. She has never used smokeless tobacco. She reports that she does not drink alcohol or use drugs.  FH: Family history was reviewed, no pertinent findings to report    Physical Exam   Constitutional: She is oriented to person, place, and time. She appears well-developed and well-nourished.   HENT:   Head: Normocephalic and atraumatic.   Mouth/Throat: No oropharyngeal exudate.   Ears tenderness along the left tragus and auricle with noted edema to the canal, along with drainage- TM with erythema, middle ear effusion with bulging.  Pos. PND, with slight erythema- without tonsillar edema or " exudate.   Mild discharge noted bilaterally- to nares.      Eyes: Pupils are equal, round, and reactive to light. EOM are normal.   Neck: Normal range of motion. Neck supple.   Cardiovascular: Normal rate and regular rhythm.    No murmur heard.  Pulmonary/Chest: Effort normal and breath sounds normal. No respiratory distress.   Musculoskeletal: Normal range of motion. She exhibits no tenderness.   Lymphadenopathy:     She has no cervical adenopathy.   Neurological: She is alert and oriented to person, place, and time.   Skin: Skin is warm. No rash noted.   Psychiatric: She has a normal mood and affect. Her behavior is normal.   Vitals reviewed.              Assessment/Plan:     1. Acute otitis externa of left ear, unspecified type  - neomycin sulf/polymyx B sulf/HC soln (CORTISPORIN HC SOL) 3.5-69762-7 Solution; Place 4 Drops in ear 4 times a day for 7 days.  Dispense: 1 Bottle; Refill: 0    2. Acute suppurative otitis media of left ear without spontaneous rupture of tympanic membrane, recurrence not specified  - amoxicillin-clavulanate (AUGMENTIN) 875-125 MG Tab; Take 1 Tab by mouth 2 times a day for 7 days.  Dispense: 14 Tab; Refill: 0    Canal with noted edema, discharge and redness.  Also TM is erythematous and bulging at this time.  We will cover patient with both drops along with oral antibiotics at this time.  Patient given precautionary s/sx that mandate immediate follow up and evaluation in the ED. Advised of risks of not doing so.    DDX, Supportive care, and indications for immediate follow-up discussed with patient.    Instructed to return to clinic or nearest emergency department if we are not available for any change in condition, further concerns, or worsening of symptoms.    The patient demonstrated a good understanding and agreed with the treatment plan.  Please note that this dictation was created using voice recognition software. I have made every reasonable attempt to correct obvious errors, but  I expect that there are errors of grammar and possibly content that I did not discover before finalizing the note.

## 2021-09-20 ENCOUNTER — TELEPHONE (OUTPATIENT)
Dept: SCHEDULING | Facility: IMAGING CENTER | Age: 26
End: 2021-09-20

## 2022-06-29 ENCOUNTER — HOSPITAL ENCOUNTER (OUTPATIENT)
Facility: MEDICAL CENTER | Age: 27
End: 2022-06-29
Attending: SURGERY | Admitting: SURGERY
Payer: COMMERCIAL

## 2022-07-02 ENCOUNTER — APPOINTMENT (OUTPATIENT)
Dept: RADIOLOGY | Facility: MEDICAL CENTER | Age: 27
End: 2022-07-02
Attending: EMERGENCY MEDICINE
Payer: COMMERCIAL

## 2022-07-02 ENCOUNTER — HOSPITAL ENCOUNTER (EMERGENCY)
Facility: MEDICAL CENTER | Age: 27
End: 2022-07-03
Attending: EMERGENCY MEDICINE
Payer: COMMERCIAL

## 2022-07-02 DIAGNOSIS — M79.674 GREAT TOE PAIN, RIGHT: ICD-10-CM

## 2022-07-02 LAB
ANION GAP SERPL CALC-SCNC: 10 MMOL/L (ref 7–16)
BASOPHILS # BLD AUTO: 0.7 % (ref 0–1.8)
BASOPHILS # BLD: 0.08 K/UL (ref 0–0.12)
BUN SERPL-MCNC: 14 MG/DL (ref 8–22)
CALCIUM SERPL-MCNC: 9.1 MG/DL (ref 8.5–10.5)
CHLORIDE SERPL-SCNC: 108 MMOL/L (ref 96–112)
CO2 SERPL-SCNC: 23 MMOL/L (ref 20–33)
CREAT SERPL-MCNC: 0.61 MG/DL (ref 0.5–1.4)
CRP SERPL HS-MCNC: 1.47 MG/DL (ref 0–0.75)
EOSINOPHIL # BLD AUTO: 0.55 K/UL (ref 0–0.51)
EOSINOPHIL NFR BLD: 5.1 % (ref 0–6.9)
ERYTHROCYTE [DISTWIDTH] IN BLOOD BY AUTOMATED COUNT: 42.5 FL (ref 35.9–50)
GFR SERPLBLD CREATININE-BSD FMLA CKD-EPI: 126 ML/MIN/1.73 M 2
GLUCOSE SERPL-MCNC: 96 MG/DL (ref 65–99)
HCT VFR BLD AUTO: 38.2 % (ref 37–47)
HGB BLD-MCNC: 12.4 G/DL (ref 12–16)
IMM GRANULOCYTES # BLD AUTO: 0.05 K/UL (ref 0–0.11)
IMM GRANULOCYTES NFR BLD AUTO: 0.5 % (ref 0–0.9)
LYMPHOCYTES # BLD AUTO: 3.93 K/UL (ref 1–4.8)
LYMPHOCYTES NFR BLD: 36.5 % (ref 22–41)
MCH RBC QN AUTO: 28.8 PG (ref 27–33)
MCHC RBC AUTO-ENTMCNC: 32.5 G/DL (ref 33.6–35)
MCV RBC AUTO: 88.8 FL (ref 81.4–97.8)
MONOCYTES # BLD AUTO: 0.71 K/UL (ref 0–0.85)
MONOCYTES NFR BLD AUTO: 6.6 % (ref 0–13.4)
NEUTROPHILS # BLD AUTO: 5.46 K/UL (ref 2–7.15)
NEUTROPHILS NFR BLD: 50.6 % (ref 44–72)
NRBC # BLD AUTO: 0 K/UL
NRBC BLD-RTO: 0 /100 WBC
PLATELET # BLD AUTO: 391 K/UL (ref 164–446)
PMV BLD AUTO: 9.6 FL (ref 9–12.9)
POTASSIUM SERPL-SCNC: 3.4 MMOL/L (ref 3.6–5.5)
RBC # BLD AUTO: 4.3 M/UL (ref 4.2–5.4)
SODIUM SERPL-SCNC: 141 MMOL/L (ref 135–145)
VIT B12 SERPL-MCNC: 504 PG/ML (ref 211–911)
WBC # BLD AUTO: 10.8 K/UL (ref 4.8–10.8)

## 2022-07-02 PROCEDURE — 82607 VITAMIN B-12: CPT

## 2022-07-02 PROCEDURE — 73660 X-RAY EXAM OF TOE(S): CPT | Mod: RT

## 2022-07-02 PROCEDURE — 80048 BASIC METABOLIC PNL TOTAL CA: CPT

## 2022-07-02 PROCEDURE — 86140 C-REACTIVE PROTEIN: CPT

## 2022-07-02 PROCEDURE — 99283 EMERGENCY DEPT VISIT LOW MDM: CPT

## 2022-07-02 PROCEDURE — 85025 COMPLETE CBC W/AUTO DIFF WBC: CPT

## 2022-07-02 PROCEDURE — 85652 RBC SED RATE AUTOMATED: CPT

## 2022-07-02 PROCEDURE — 36415 COLL VENOUS BLD VENIPUNCTURE: CPT

## 2022-07-02 PROCEDURE — 93922 UPR/L XTREMITY ART 2 LEVELS: CPT

## 2022-07-03 VITALS
HEART RATE: 83 BPM | WEIGHT: 264.33 LBS | BODY MASS INDEX: 42.48 KG/M2 | DIASTOLIC BLOOD PRESSURE: 58 MMHG | HEIGHT: 66 IN | RESPIRATION RATE: 16 BRPM | OXYGEN SATURATION: 94 % | SYSTOLIC BLOOD PRESSURE: 114 MMHG | TEMPERATURE: 97.1 F

## 2022-07-03 LAB — ERYTHROCYTE [SEDIMENTATION RATE] IN BLOOD BY WESTERGREN METHOD: 20 MM/HOUR (ref 0–25)

## 2022-07-03 NOTE — ED NOTES
"Pt discharged home. IV discontinued and gauze placed, pt in possession of belongings. Pt provided discharge education and information pertaining to medications and follow up appointments. Pt received copy of discharge instructions and verbalized understanding. /58   Pulse 83   Temp 36.2 °C (97.1 °F) (Temporal)   Resp 16   Ht 1.676 m (5' 6\")   Wt 120 kg (264 lb 5.3 oz)   SpO2 94%   BMI 42.66 kg/m²   "

## 2022-07-03 NOTE — ED PROVIDER NOTES
"ED Provider Note    CHIEF COMPLAINT  Chief Complaint   Patient presents with   • Toe Pain     Pain in right great toe x1 week; pt denies injury       HPI  Shauna Sloan is a 26 y.o. female who presents to the emergency room complaining of right great toe pain. Past medical history as documented below.    She explained that she got a recent pedicure approximate one week ago and since then has been having ongoing toe pain especially in the outer aspect of her right great toe. Given her history of diabetes she was concerned about possible infection. No notable aggravated leaving factors to the toe pain. Does state that it does have intermittent numbness and tingling to it. No prior history the same. States that her blood sugar is relatively well controlled. No other symptoms of recent.    REVIEW OF SYSTEMS  See HPI for further details. All other systems are negative.     PAST MEDICAL HISTORY   has a past medical history of Anxiety, Hypertension, Sleep apnea, and Type II diabetes mellitus (HCC).    SOCIAL HISTORY  Social History     Tobacco Use   • Smoking status: Former Smoker   • Smokeless tobacco: Never Used   Vaping Use   • Vaping Use: Former   Substance and Sexual Activity   • Alcohol use: Yes     Comment: \"one beer a month or so\"   • Drug use: Yes     Types: Inhaled     Comment: marijuana   • Sexual activity: Yes     Partners: Male     Comment: none        SURGICAL HISTORY  patient denies any surgical history    CURRENT MEDICATIONS  Home Medications     Reviewed by Kira Cardoza R.N. (Registered Nurse) on 07/02/22 at 2058  Med List Status: Not Addressed   Medication Last Dose Status   amoxicillin-clavulanate (AUGMENTIN) 875-125 MG Tab  Active   docusate sodium 100 MG Cap  Active   ferrous sulfate 325 (65 FE) MG tablet  Active   ibuprofen (MOTRIN) 600 MG Tab  Active   Prenatal MV-Min-Fe Fum-FA-DHA (PRENATAL 1 PO)  Active   prenatal plus vitamin (STUARTNATAL 1+1) 27-1 MG Tab tablet  Active          " "      ALLERGIES  No Known Allergies    PHYSICAL EXAM  VITAL SIGNS: /58   Pulse 83   Temp 36.2 °C (97.1 °F) (Temporal)   Resp 16   Ht 1.676 m (5' 6\")   Wt 120 kg (264 lb 5.3 oz)   SpO2 94%   BMI 42.66 kg/m²  @DANIELLE[[id number]::@   Pulse ox interpretation: I interpret this pulse ox as normal.  Constitutional: Alert in no apparent distress.  HENT: No signs of trauma, Bilateral external ears normal, Nose normal.   Eyes: Pupils are equal and reactive  Neck: Normal range of motion, No tenderness, Supple  Cardiovascular: Regular rate and rhythm, no murmurs.   Thorax & Lungs: Normal breath sounds, No respiratory distress, No wheezing, No chest tenderness.   Abdomen: Bowel sounds normal, Soft  Skin: Warm, Dry, No erythema  Musculoskeletal: Good range of motion in all major joints. No tenderness to palpation or major deformities noted.   Right foot: right foot and specifically right great toe is symmetric to left. There is no signs of infection. There are no direct points of tenderness. No areas of necrosis. 2+ DPP and PTP.  Neurologic: Alert , Normal motor function, Normal sensory function, No focal deficits noted.   Psychiatric: Affect normal, Judgment normal, Mood normal.       DIAGNOSTIC STUDIES / PROCEDURES    LABS  Results for orders placed or performed during the hospital encounter of 07/02/22   CBC WITH DIFFERENTIAL   Result Value Ref Range    WBC 10.8 4.8 - 10.8 K/uL    RBC 4.30 4.20 - 5.40 M/uL    Hemoglobin 12.4 12.0 - 16.0 g/dL    Hematocrit 38.2 37.0 - 47.0 %    MCV 88.8 81.4 - 97.8 fL    MCH 28.8 27.0 - 33.0 pg    MCHC 32.5 (L) 33.6 - 35.0 g/dL    RDW 42.5 35.9 - 50.0 fL    Platelet Count 391 164 - 446 K/uL    MPV 9.6 9.0 - 12.9 fL    Neutrophils-Polys 50.60 44.00 - 72.00 %    Lymphocytes 36.50 22.00 - 41.00 %    Monocytes 6.60 0.00 - 13.40 %    Eosinophils 5.10 0.00 - 6.90 %    Basophils 0.70 0.00 - 1.80 %    Immature Granulocytes 0.50 0.00 - 0.90 %    Nucleated RBC 0.00 /100 WBC    Neutrophils " (Absolute) 5.46 2.00 - 7.15 K/uL    Lymphs (Absolute) 3.93 1.00 - 4.80 K/uL    Monos (Absolute) 0.71 0.00 - 0.85 K/uL    Eos (Absolute) 0.55 (H) 0.00 - 0.51 K/uL    Baso (Absolute) 0.08 0.00 - 0.12 K/uL    Immature Granulocytes (abs) 0.05 0.00 - 0.11 K/uL    NRBC (Absolute) 0.00 K/uL   BASIC METABOLIC PANEL   Result Value Ref Range    Sodium 141 135 - 145 mmol/L    Potassium 3.4 (L) 3.6 - 5.5 mmol/L    Chloride 108 96 - 112 mmol/L    Co2 23 20 - 33 mmol/L    Glucose 96 65 - 99 mg/dL    Bun 14 8 - 22 mg/dL    Creatinine 0.61 0.50 - 1.40 mg/dL    Calcium 9.1 8.5 - 10.5 mg/dL    Anion Gap 10.0 7.0 - 16.0   Sed Rate   Result Value Ref Range    Sed Rate Westergren 20 0 - 25 mm/hour   CRP QUANTITIVE (NON-CARDIAC)   Result Value Ref Range    Stat C-Reactive Protein 1.47 (H) 0.00 - 0.75 mg/dL   VITAMIN B12   Result Value Ref Range    Vitamin B12 -True Cobalamin 504 211 - 911 pg/mL   ESTIMATED GFR   Result Value Ref Range    GFR (CKD-EPI) 126 >60 mL/min/1.73 m 2         RADIOLOGY  US-CARLY SINGLE LEVEL BILAT   Final Result      DX-TOE(S) 2+ RIGHT   Final Result      No evidence of fracture or dislocation.            COURSE & MEDICAL DECISION MAKING  Pertinent Labs & Imaging studies reviewed. (See chart for details)  26-year-old diabetic female presented emergency room with no pain. This is occurring after pedicure. No signs of infection and clinical exam. Fortunately workup is benign. I do not see any acute pathology. At this point I will refer the patient back to her primary care surgeon for ongoing outpatient care workup and follow-up. She is understanding return precautions the ER with any changes or worsening.       The patient will return for worsening symptoms and is stable at the time of discharge. The patient verbalizes understanding and will comply.    FINAL IMPRESSION  1. Great toe pain, right            Electronically signed by: Rasheed Grant M.D., 7/2/2022 10:14 PM

## 2022-07-03 NOTE — ED TRIAGE NOTES
Chief Complaint   Patient presents with   • Toe Pain     Pain in right great toe x1 week; pt denies injury     Pt ambulatory to triage with  for above complaint. No redness or swelling noted to right great toe but pt has pain on palpation to distal half of toe. Pt states that she has diabetes and it has been hurting too long so she wants to make sure it's not infected. Pt speaks Yakut primarily so her  is translating at pt's request.      Pt is alert/oriented and follows commands. Pt speaking in full sentences and responds appropriately to questions. No acute distress noted in triage and respirations are even and unlabored.     Pt placed in lobby and educated on triage process. Pt and  encouraged to alert staff for any changes in condition.

## 2022-07-03 NOTE — ED NOTES
Pt ambulated to [facility] from the [facility] with a steady gait.

## 2022-08-08 ENCOUNTER — APPOINTMENT (OUTPATIENT)
Dept: ADMISSIONS | Facility: MEDICAL CENTER | Age: 27
End: 2022-08-08
Payer: COMMERCIAL

## 2022-08-08 ENCOUNTER — PRE-ADMISSION TESTING (OUTPATIENT)
Dept: ADMISSIONS | Facility: MEDICAL CENTER | Age: 27
End: 2022-08-08
Attending: SURGERY
Payer: COMMERCIAL

## 2022-08-08 VITALS — WEIGHT: 251.32 LBS | BODY MASS INDEX: 40.39 KG/M2 | HEIGHT: 66 IN

## 2022-08-08 DIAGNOSIS — Z01.810 PRE-OPERATIVE CARDIOVASCULAR EXAMINATION: ICD-10-CM

## 2022-08-08 DIAGNOSIS — Z01.812 PRE-OPERATIVE LABORATORY EXAMINATION: ICD-10-CM

## 2022-08-08 LAB
ANION GAP SERPL CALC-SCNC: 9 MMOL/L (ref 7–16)
BUN SERPL-MCNC: 9 MG/DL (ref 8–22)
CALCIUM SERPL-MCNC: 9.4 MG/DL (ref 8.5–10.5)
CHLORIDE SERPL-SCNC: 105 MMOL/L (ref 96–112)
CO2 SERPL-SCNC: 25 MMOL/L (ref 20–33)
CREAT SERPL-MCNC: 0.53 MG/DL (ref 0.5–1.4)
EKG IMPRESSION: NORMAL
ERYTHROCYTE [DISTWIDTH] IN BLOOD BY AUTOMATED COUNT: 40.5 FL (ref 35.9–50)
EST. AVERAGE GLUCOSE BLD GHB EST-MCNC: 120 MG/DL
GFR SERPLBLD CREATININE-BSD FMLA CKD-EPI: 130 ML/MIN/1.73 M 2
GLUCOSE SERPL-MCNC: 94 MG/DL (ref 65–99)
HBA1C MFR BLD: 5.8 % (ref 4–5.6)
HCT VFR BLD AUTO: 42.4 % (ref 37–47)
HGB BLD-MCNC: 13.7 G/DL (ref 12–16)
MCH RBC QN AUTO: 28.4 PG (ref 27–33)
MCHC RBC AUTO-ENTMCNC: 32.3 G/DL (ref 33.6–35)
MCV RBC AUTO: 88 FL (ref 81.4–97.8)
PLATELET # BLD AUTO: 421 K/UL (ref 164–446)
PMV BLD AUTO: 9.6 FL (ref 9–12.9)
POTASSIUM SERPL-SCNC: 3.9 MMOL/L (ref 3.6–5.5)
RBC # BLD AUTO: 4.82 M/UL (ref 4.2–5.4)
SODIUM SERPL-SCNC: 139 MMOL/L (ref 135–145)
WBC # BLD AUTO: 8.1 K/UL (ref 4.8–10.8)

## 2022-08-08 PROCEDURE — 36415 COLL VENOUS BLD VENIPUNCTURE: CPT

## 2022-08-08 PROCEDURE — 93010 ELECTROCARDIOGRAM REPORT: CPT | Performed by: INTERNAL MEDICINE

## 2022-08-08 PROCEDURE — 93005 ELECTROCARDIOGRAM TRACING: CPT

## 2022-08-08 PROCEDURE — 85027 COMPLETE CBC AUTOMATED: CPT

## 2022-08-08 PROCEDURE — 80048 BASIC METABOLIC PNL TOTAL CA: CPT

## 2022-08-08 PROCEDURE — 83036 HEMOGLOBIN GLYCOSYLATED A1C: CPT

## 2022-08-08 RX ORDER — LISINOPRIL 10 MG/1
10 TABLET ORAL DAILY
COMMUNITY
End: 2023-10-18

## 2023-05-02 ENCOUNTER — APPOINTMENT (OUTPATIENT)
Dept: RADIOLOGY | Facility: MEDICAL CENTER | Age: 28
End: 2023-05-02
Attending: EMERGENCY MEDICINE

## 2023-05-02 ENCOUNTER — HOSPITAL ENCOUNTER (EMERGENCY)
Facility: MEDICAL CENTER | Age: 28
End: 2023-05-02
Attending: EMERGENCY MEDICINE
Payer: MEDICAID

## 2023-05-02 VITALS
WEIGHT: 186.29 LBS | HEIGHT: 66 IN | DIASTOLIC BLOOD PRESSURE: 60 MMHG | HEART RATE: 64 BPM | OXYGEN SATURATION: 96 % | TEMPERATURE: 98.5 F | BODY MASS INDEX: 29.94 KG/M2 | RESPIRATION RATE: 16 BRPM | SYSTOLIC BLOOD PRESSURE: 103 MMHG

## 2023-05-02 DIAGNOSIS — O46.8X1 SUBCHORIONIC HEMORRHAGE OF PLACENTA IN FIRST TRIMESTER, SINGLE OR UNSPECIFIED FETUS: Primary | ICD-10-CM

## 2023-05-02 DIAGNOSIS — Z34.91 FIRST TRIMESTER PREGNANCY: ICD-10-CM

## 2023-05-02 DIAGNOSIS — O41.8X10 SUBCHORIONIC HEMORRHAGE OF PLACENTA IN FIRST TRIMESTER, SINGLE OR UNSPECIFIED FETUS: Primary | ICD-10-CM

## 2023-05-02 DIAGNOSIS — R07.9 CHEST PAIN, UNSPECIFIED TYPE: ICD-10-CM

## 2023-05-02 LAB
ACTION RH IMMUNE GLOB 8505RHG: NORMAL
ALBUMIN SERPL BCP-MCNC: 3.7 G/DL (ref 3.2–4.9)
ALBUMIN/GLOB SERPL: 1.5 G/DL
ALP SERPL-CCNC: 81 U/L (ref 30–99)
ALT SERPL-CCNC: 13 U/L (ref 2–50)
ANION GAP SERPL CALC-SCNC: 10 MMOL/L (ref 7–16)
APPEARANCE UR: CLEAR
AST SERPL-CCNC: 12 U/L (ref 12–45)
B-HCG SERPL-ACNC: ABNORMAL MIU/ML (ref 0–5)
BACTERIA #/AREA URNS HPF: NEGATIVE /HPF
BASOPHILS # BLD AUTO: 0.8 % (ref 0–1.8)
BASOPHILS # BLD: 0.08 K/UL (ref 0–0.12)
BILIRUB SERPL-MCNC: 0.2 MG/DL (ref 0.1–1.5)
BILIRUB UR QL STRIP.AUTO: NEGATIVE
BUN SERPL-MCNC: 10 MG/DL (ref 8–22)
CALCIUM ALBUM COR SERPL-MCNC: 9 MG/DL (ref 8.5–10.5)
CALCIUM SERPL-MCNC: 8.8 MG/DL (ref 8.5–10.5)
CHLORIDE SERPL-SCNC: 109 MMOL/L (ref 96–112)
CO2 SERPL-SCNC: 20 MMOL/L (ref 20–33)
COLOR UR: YELLOW
CREAT SERPL-MCNC: 0.48 MG/DL (ref 0.5–1.4)
EKG IMPRESSION: NORMAL
EOSINOPHIL # BLD AUTO: 0.34 K/UL (ref 0–0.51)
EOSINOPHIL NFR BLD: 3.5 % (ref 0–6.9)
EPI CELLS #/AREA URNS HPF: NORMAL /HPF
ERYTHROCYTE [DISTWIDTH] IN BLOOD BY AUTOMATED COUNT: 38.9 FL (ref 35.9–50)
GFR SERPLBLD CREATININE-BSD FMLA CKD-EPI: 133 ML/MIN/1.73 M 2
GLOBULIN SER CALC-MCNC: 2.5 G/DL (ref 1.9–3.5)
GLUCOSE SERPL-MCNC: 110 MG/DL (ref 65–99)
GLUCOSE UR STRIP.AUTO-MCNC: NEGATIVE MG/DL
HCT VFR BLD AUTO: 36 % (ref 37–47)
HGB BLD-MCNC: 12.1 G/DL (ref 12–16)
HYALINE CASTS #/AREA URNS LPF: NORMAL /LPF
IMM GRANULOCYTES # BLD AUTO: 0.03 K/UL (ref 0–0.11)
IMM GRANULOCYTES NFR BLD AUTO: 0.3 % (ref 0–0.9)
KETONES UR STRIP.AUTO-MCNC: NEGATIVE MG/DL
LEUKOCYTE ESTERASE UR QL STRIP.AUTO: ABNORMAL
LIPASE SERPL-CCNC: 54 U/L (ref 11–82)
LYMPHOCYTES # BLD AUTO: 3.64 K/UL (ref 1–4.8)
LYMPHOCYTES NFR BLD: 37.1 % (ref 22–41)
MCH RBC QN AUTO: 29.3 PG (ref 27–33)
MCHC RBC AUTO-ENTMCNC: 33.6 G/DL (ref 33.6–35)
MCV RBC AUTO: 87.2 FL (ref 81.4–97.8)
MICRO URNS: ABNORMAL
MONOCYTES # BLD AUTO: 0.43 K/UL (ref 0–0.85)
MONOCYTES NFR BLD AUTO: 4.4 % (ref 0–13.4)
NEUTROPHILS # BLD AUTO: 5.3 K/UL (ref 2–7.15)
NEUTROPHILS NFR BLD: 53.9 % (ref 44–72)
NITRITE UR QL STRIP.AUTO: NEGATIVE
NRBC # BLD AUTO: 0 K/UL
NRBC BLD-RTO: 0 /100 WBC
NUMBER OF RH DOSES IND 8505RD: 1
PH UR STRIP.AUTO: 6 [PH] (ref 5–8)
PLATELET # BLD AUTO: 340 K/UL (ref 164–446)
PMV BLD AUTO: 9.7 FL (ref 9–12.9)
POTASSIUM SERPL-SCNC: 3.9 MMOL/L (ref 3.6–5.5)
PROT SERPL-MCNC: 6.2 G/DL (ref 6–8.2)
PROT UR QL STRIP: NEGATIVE MG/DL
RBC # BLD AUTO: 4.13 M/UL (ref 4.2–5.4)
RBC # URNS HPF: NORMAL /HPF
RBC UR QL AUTO: NEGATIVE
RH BLD: NORMAL
SODIUM SERPL-SCNC: 139 MMOL/L (ref 135–145)
SP GR UR STRIP.AUTO: 1.02
TROPONIN T SERPL-MCNC: <6 NG/L (ref 6–19)
UROBILINOGEN UR STRIP.AUTO-MCNC: 1 MG/DL
WBC # BLD AUTO: 9.8 K/UL (ref 4.8–10.8)
WBC #/AREA URNS HPF: NORMAL /HPF

## 2023-05-02 PROCEDURE — 86901 BLOOD TYPING SEROLOGIC RH(D): CPT

## 2023-05-02 PROCEDURE — 84702 CHORIONIC GONADOTROPIN TEST: CPT

## 2023-05-02 PROCEDURE — 84484 ASSAY OF TROPONIN QUANT: CPT

## 2023-05-02 PROCEDURE — 96374 THER/PROPH/DIAG INJ IV PUSH: CPT

## 2023-05-02 PROCEDURE — 83690 ASSAY OF LIPASE: CPT

## 2023-05-02 PROCEDURE — A9270 NON-COVERED ITEM OR SERVICE: HCPCS | Performed by: EMERGENCY MEDICINE

## 2023-05-02 PROCEDURE — 93005 ELECTROCARDIOGRAM TRACING: CPT | Performed by: EMERGENCY MEDICINE

## 2023-05-02 PROCEDURE — 81001 URINALYSIS AUTO W/SCOPE: CPT

## 2023-05-02 PROCEDURE — 85025 COMPLETE CBC W/AUTO DIFF WBC: CPT

## 2023-05-02 PROCEDURE — 700102 HCHG RX REV CODE 250 W/ 637 OVERRIDE(OP): Performed by: EMERGENCY MEDICINE

## 2023-05-02 PROCEDURE — 76801 OB US < 14 WKS SINGLE FETUS: CPT

## 2023-05-02 PROCEDURE — 99284 EMERGENCY DEPT VISIT MOD MDM: CPT

## 2023-05-02 PROCEDURE — 36415 COLL VENOUS BLD VENIPUNCTURE: CPT

## 2023-05-02 PROCEDURE — 80053 COMPREHEN METABOLIC PANEL: CPT

## 2023-05-02 RX ORDER — ACETAMINOPHEN 500 MG
1000 TABLET ORAL ONCE
Status: COMPLETED | OUTPATIENT
Start: 2023-05-02 | End: 2023-05-02

## 2023-05-02 RX ADMIN — ACETAMINOPHEN 1000 MG: 500 TABLET, FILM COATED ORAL at 03:39

## 2023-05-02 NOTE — ED TRIAGE NOTES
"Shauna Sloan  Chief Complaint   Patient presents with    Low Back Pain     Pt is approximately one month pregnant. Pt had gastric sleeve surgery last august. Began having severe back pain three hours ago. + abd cramping, - vaginal bleeding.     Chest Pain     Epigastric and chest pain. Pt complains of burning pain in her upper back. Denies cardiac hx. Tearful.     Abdominal Pain     Bilateral lower quadrants, and epigastric area.         Patient and staff wearing appropriate PPE    /73   Pulse 77   Temp 36.4 °C (97.5 °F) (Temporal)   Resp 18   Ht 1.676 m (5' 6\")   Wt 84.5 kg (186 lb 4.6 oz)   SpO2 99%   BMI 30.07 kg/m²     "

## 2023-05-02 NOTE — ED PROVIDER NOTES
"ED Provider Note    CHIEF COMPLAINT  Chief Complaint   Patient presents with    Low Back Pain     Pt is approximately one month pregnant. Pt had gastric sleeve surgery last august. Began having severe back pain three hours ago. + abd cramping, - vaginal bleeding.     Chest Pain     Epigastric and chest pain. Pt complains of burning pain in her upper back. Denies cardiac hx. Tearful.     Abdominal Pain     Bilateral lower quadrants, and epigastric area.        EXTERNAL RECORDS REVIEWED  Last hospitalization was for childbirth 10/10/2016.  She was given RhoGAM during last pregnancy.    HPI/ROS  LIMITATION TO HISTORY   Select: Language Divehi speaking,  Used #[id number]    OUTSIDE HISTORIAN(S):  none    Shauna Sloan is a 27 y.o. female who presents epigastric pain, left chest and upper back pain, pelvic pain in setting of pregnancy.  By time of my evaluation she says she been feeling better.  Earlier in the evening she was having lower abdominal cramping, had some episodes of vomiting which she thought was unusual because she had a gastric sleeve surgery.  No fevers.  No blood in vomit.  Denies vaginal bleeding.  No vaginal discharge.  No pain with urination.  She has not yet established care with OB/GYN.    PAST MEDICAL HISTORY   has a past medical history of Anxiety, Gynecological disorder, Hypertension, Psychiatric problem, Sleep apnea, and Type II diabetes mellitus (HCC).    SURGICAL HISTORY  patient denies any surgical history    FAMILY HISTORY  Family History   Problem Relation Age of Onset    Other Sister         Hep B        SOCIAL HISTORY  Social History     Tobacco Use    Smoking status: Former     Types: Cigarettes     Quit date: 2020     Years since quitting: 3.3    Smokeless tobacco: Never   Vaping Use    Vaping Use: Former    Quit date: 1/1/2020   Substance and Sexual Activity    Alcohol use: Not Currently     Comment: \"one beer a month or so\".    Drug use: Not Currently     Types: Inhaled " "    Comment: marijuana- quit around 6/2022    Sexual activity: Yes     Partners: Male     Comment: none        CURRENT MEDICATIONS  Home Medications    **Home medications have not yet been reviewed for this encounter**         ALLERGIES  No Known Allergies    PHYSICAL EXAM  VITAL SIGNS: /60   Pulse 64   Temp 36.9 °C (98.5 °F) (Temporal)   Resp 16   Ht 1.676 m (5' 6\")   Wt 84.5 kg (186 lb 4.6 oz)   SpO2 96%   BMI 30.07 kg/m²    Physical Exam  Vitals and nursing note reviewed.   Constitutional:       Appearance: Normal appearance.   HENT:      Head: Normocephalic.      Mouth/Throat:      Mouth: Mucous membranes are moist.   Eyes:      Pupils: Pupils are equal, round, and reactive to light.   Cardiovascular:      Rate and Rhythm: Normal rate and regular rhythm.   Pulmonary:      Effort: Pulmonary effort is normal. No respiratory distress.      Breath sounds: Normal breath sounds.   Abdominal:      General: There is no distension.      Tenderness: There is no abdominal tenderness.   Neurological:      General: No focal deficit present.      Mental Status: She is alert.   Psychiatric:         Mood and Affect: Mood normal.         DIAGNOSTIC STUDIES / PROCEDURES  EKG  I have independently interpreted this EKG  See below    LABS  Results for orders placed or performed during the hospital encounter of 05/02/23   CBC WITH DIFFERENTIAL   Result Value Ref Range    WBC 9.8 4.8 - 10.8 K/uL    RBC 4.13 (L) 4.20 - 5.40 M/uL    Hemoglobin 12.1 12.0 - 16.0 g/dL    Hematocrit 36.0 (L) 37.0 - 47.0 %    MCV 87.2 81.4 - 97.8 fL    MCH 29.3 27.0 - 33.0 pg    MCHC 33.6 33.6 - 35.0 g/dL    RDW 38.9 35.9 - 50.0 fL    Platelet Count 340 164 - 446 K/uL    MPV 9.7 9.0 - 12.9 fL    Neutrophils-Polys 53.90 44.00 - 72.00 %    Lymphocytes 37.10 22.00 - 41.00 %    Monocytes 4.40 0.00 - 13.40 %    Eosinophils 3.50 0.00 - 6.90 %    Basophils 0.80 0.00 - 1.80 %    Immature Granulocytes 0.30 0.00 - 0.90 %    Nucleated RBC 0.00 /100 WBC    " Neutrophils (Absolute) 5.30 2.00 - 7.15 K/uL    Lymphs (Absolute) 3.64 1.00 - 4.80 K/uL    Monos (Absolute) 0.43 0.00 - 0.85 K/uL    Eos (Absolute) 0.34 0.00 - 0.51 K/uL    Baso (Absolute) 0.08 0.00 - 0.12 K/uL    Immature Granulocytes (abs) 0.03 0.00 - 0.11 K/uL    NRBC (Absolute) 0.00 K/uL   COMP METABOLIC PANEL   Result Value Ref Range    Sodium 139 135 - 145 mmol/L    Potassium 3.9 3.6 - 5.5 mmol/L    Chloride 109 96 - 112 mmol/L    Co2 20 20 - 33 mmol/L    Anion Gap 10.0 7.0 - 16.0    Glucose 110 (H) 65 - 99 mg/dL    Bun 10 8 - 22 mg/dL    Creatinine 0.48 (L) 0.50 - 1.40 mg/dL    Calcium 8.8 8.5 - 10.5 mg/dL    AST(SGOT) 12 12 - 45 U/L    ALT(SGPT) 13 2 - 50 U/L    Alkaline Phosphatase 81 30 - 99 U/L    Total Bilirubin 0.2 0.1 - 1.5 mg/dL    Albumin 3.7 3.2 - 4.9 g/dL    Total Protein 6.2 6.0 - 8.2 g/dL    Globulin 2.5 1.9 - 3.5 g/dL    A-G Ratio 1.5 g/dL   LIPASE   Result Value Ref Range    Lipase 54 11 - 82 U/L   HCG QUANTITATIVE   Result Value Ref Range    Bhcg 92864.0 (H) 0.0 - 5.0 mIU/mL   URINALYSIS,CULTURE IF INDICATED    Specimen: Urine   Result Value Ref Range    Color Yellow     Character Clear     Specific Gravity 1.022 <1.035    Ph 6.0 5.0 - 8.0    Glucose Negative Negative mg/dL    Ketones Negative Negative mg/dL    Protein Negative Negative mg/dL    Bilirubin Negative Negative    Urobilinogen, Urine 1.0 Negative    Nitrite Negative Negative    Leukocyte Esterase Trace (A) Negative    Occult Blood Negative Negative    Micro Urine Req Microscopic    CORRECTED CALCIUM   Result Value Ref Range    Correct Calcium 9.0 8.5 - 10.5 mg/dL   ESTIMATED GFR   Result Value Ref Range    GFR (CKD-EPI) 133 >60 mL/min/1.73 m 2   RH TYPE FOR RHOGAM FROM E.D.   Result Value Ref Range    Emergency Department Rh Typing NEG     Number Of Rh Doses Indicated 1    URINE MICROSCOPIC (W/UA)   Result Value Ref Range    WBC 0-2 /hpf    RBC 0-2 /hpf    Bacteria Negative None /hpf    Epithelial Cells Few /hpf    Hyaline Cast 0-2  /lpf   ACTION: RH IMMUNE GLOBULIN   Result Value Ref Range    Action  Rh Immune Globulin [id number]       issued       1 Syrng    TROPONIN   Result Value Ref Range    Troponin T <6 6 - 19 ng/L   EKG   Result Value Ref Range    Report       [facility].    Test Date:  2023  Pt Name:    ALICIA Cooperpartment: ER  MRN:        [id number]                      Room:       [facility]  Gender:     Female                       Technician: [id number]  :        [id number]                   Requested By:TODD GARZA II  Order #:    [id number]                    Reading MD: Todd Garza II, MD    Measurements  Intervals                                Axis  Rate:       62                           P:          30  SD:         144                          QRS:        70  QRSD:       85                           T:          36  QT:         404  QTc:        411    Interpretive Statements  Sinus rhythm  Rate 62  Normal intervals  No ST elevation or depression. Normal Twaves.  Impression: Normal sinus rhythm EKG.  Compared to ECG 2022 09:57:42  T-wave abnormality now present  Sinus bradycardia no longer present  Electronically Signed On 2023 6:20:03 PDT by Todd leblanc II, MD           RADIOLOGY  US-OB 1ST TRIMESTER WITH TRANSVAGINAL (COMBO)   Final Result         1.  Single Intrauterine gestational sac at 6 weeks, 4 days estimated gestational age.   2.  No fetal pole definitively identified, cannot assess for viability.   3.  Small subchorionic hemorrhage   4.  Echogenic structure in the left ovary, appearance suggests hemorrhagic corpus luteal cyst, otherwise indeterminate.   5.  Small free fluid collection the pelvis.            COURSE & MEDICAL DECISION MAKING    ED Observation Status? Yes; I am placing the patient in to an observation status due to a diagnostic uncertainty as well as therapeutic intensity. Patient placed in observation status at 3:00  AM, 2023.     Observation plan is as follows: Serum studies, ultrasound, pain and nausea management, EKG    Upon Reevaluation, the patient's condition has: Improved; and will be discharged.    Patient discharged from ED Observation status at 2023 6:16 AM     INITIAL ASSESSMENT, COURSE AND PLAN  Care Narrative: This is an emergent evaluation of a 27-year-old woman  now presenting with 2 concerns.  She is having upper left-sided back pain and some chest pain.  No shortness of breath.  Pain is not pleuritic.  Feels like a sharp cramping pain.  He also is having lower abdominal cramping.  She is denying any bleeding.  No loss of fluid.  She is pregnant and found out recently.  No fevers.  Concerns for miscarriage, ectopic pregnancy, UTI, renal stone.  CBC, CMP, beta quant, pelvic ultrasound ordered.  Because of upper left back and chest pain a EKG and troponin will be done to look for any signs of ischemia or repolarization abnormalities.  Lower suspicion for PE.  Saturations normal.  No tachycardia.  No leg swelling.  No pleuritic pain.  He received Tylenol for pain.  She is also had some nausea and she will receive Zofran.    3:44 AM  Ultrasound shows IUP with small subchorionic hemorrhage. Possible ruptured cyst as well.  She previously needed RhoGAM.  She will be given dose of RhoGAM here.  EKG and troponin pending.  She is on cardiac monitors and there is no obvious repolarization or rhythm abnormalities.    4:20 AM  Discussed plan via . EKG normal Troponin undetectable. HEART Score is 1. Awaiting RhoGAM    6:16 AM  RhoGAM given. Referral to OBGYN placed. She should call this week to make an appointment to establish care. Return to ER if having vaginal bleeding, worsening pain, fever or other serious concerns.          PROBLEM LIST  #Pregnancy with pelvic cramping   -Subchorionic hemorrhage with living IUP   -possible ruptured corpus luteal cyst   -Given RhoGAM because of the subchorionic  hemorrhage   -Referral to [facility] placed    #Chest pain   -Unclear etiology.  It has resolved after Tylenol and Zofran.  It could be GI related.   -Normal EKG and troponin.  Heart score is 1.    DISPOSITION AND DISCUSSIONS      FINAL DIAGNOSIS  1. Subchorionic hemorrhage of placenta in first trimester, single or unspecified fetus    2. Chest pain, unspecified type    3. First trimester pregnancy           Electronically signed by: Todd Garza II, M.D., 5/2/2023 3:41 AM

## 2023-05-15 ENCOUNTER — APPOINTMENT (OUTPATIENT)
Dept: OBGYN | Facility: CLINIC | Age: 28
End: 2023-05-15
Payer: MEDICAID

## 2023-05-15 ENCOUNTER — INITIAL PRENATAL (OUTPATIENT)
Dept: OBGYN | Facility: CLINIC | Age: 28
End: 2023-05-15
Payer: MEDICAID

## 2023-05-15 VITALS
HEIGHT: 64 IN | SYSTOLIC BLOOD PRESSURE: 100 MMHG | DIASTOLIC BLOOD PRESSURE: 58 MMHG | WEIGHT: 180 LBS | BODY MASS INDEX: 30.73 KG/M2

## 2023-05-15 DIAGNOSIS — Z90.3 H/O GASTRIC SLEEVE: ICD-10-CM

## 2023-05-15 DIAGNOSIS — O09.91 ENCOUNTER FOR SUPERVISION OF HIGH RISK PREGNANCY IN FIRST TRIMESTER, ANTEPARTUM: Primary | ICD-10-CM

## 2023-05-15 PROCEDURE — 3078F DIAST BP <80 MM HG: CPT | Performed by: NURSE PRACTITIONER

## 2023-05-15 PROCEDURE — 3074F SYST BP LT 130 MM HG: CPT | Performed by: NURSE PRACTITIONER

## 2023-05-15 PROCEDURE — 0500F INITIAL PRENATAL CARE VISIT: CPT | Performed by: NURSE PRACTITIONER

## 2023-05-15 RX ORDER — ONDANSETRON 4 MG/1
4 TABLET, ORALLY DISINTEGRATING ORAL EVERY 6 HOURS PRN
Qty: 28 TABLET | Refills: 0 | Status: SHIPPED | OUTPATIENT
Start: 2023-05-15 | End: 2023-05-22

## 2023-05-15 RX ORDER — LANCETS 30 GAUGE
EACH MISCELLANEOUS
Qty: 100 EACH | Refills: 0 | Status: SHIPPED | OUTPATIENT
Start: 2023-05-15 | End: 2023-10-18

## 2023-05-15 RX ORDER — GLUCOSAMINE HCL/CHONDROITIN SU 500-400 MG
CAPSULE ORAL
Qty: 100 EACH | Refills: 3 | Status: SHIPPED | OUTPATIENT
Start: 2023-05-15 | End: 2023-10-18

## 2023-05-15 ASSESSMENT — ENCOUNTER SYMPTOMS
PSYCHIATRIC NEGATIVE: 1
CONSTITUTIONAL NEGATIVE: 1
MUSCULOSKELETAL NEGATIVE: 1
EYES NEGATIVE: 1
HEADACHES: 1
GASTROINTESTINAL NEGATIVE: 1
RESPIRATORY NEGATIVE: 1
CARDIOVASCULAR NEGATIVE: 1

## 2023-05-15 ASSESSMENT — FIBROSIS 4 INDEX: FIB4 SCORE: 0.26

## 2023-05-15 ASSESSMENT — EDINBURGH POSTNATAL DEPRESSION SCALE (EPDS)
I HAVE FELT SCARED OR PANICKY FOR NO GOOD REASON: NO, NOT MUCH
I HAVE FELT SAD OR MISERABLE: YES, QUITE OFTEN
THINGS HAVE BEEN GETTING ON TOP OF ME: NO, MOST OF THE TIME I HAVE COPED QUITE WELL
I HAVE BEEN ANXIOUS OR WORRIED FOR NO GOOD REASON: YES, SOMETIMES
TOTAL SCORE: 15
I HAVE BEEN SO UNHAPPY THAT I HAVE BEEN CRYING: YES, MOST OF THE TIME
I HAVE BEEN SO UNHAPPY THAT I HAVE HAD DIFFICULTY SLEEPING: YES, SOMETIMES
I HAVE BEEN ABLE TO LAUGH AND SEE THE FUNNY SIDE OF THINGS: NOT QUITE SO MUCH NOW
I HAVE LOOKED FORWARD WITH ENJOYMENT TO THINGS: RATHER LESS THAN I USED TO
THE THOUGHT OF HARMING MYSELF HAS OCCURRED TO ME: NEVER
I HAVE BLAMED MYSELF UNNECESSARILY WHEN THINGS WENT WRONG: YES, SOME OF THE TIME

## 2023-05-15 NOTE — PROGRESS NOTES
"Subjective     S:  Shauna Sloan is a 27 y.o.  female  @ She is 8w3d with an BRENNEN of Estimated Date of Delivery: 23 based off of US who presents for her new OB exam.      Her OB hx includes  x 1.   History of HSV I or II in self or partner: no   History of STIs in self or partner: no  History of Thyroid problems: no  Hx of gastric sleeve surgery 9 months ago  Hx of Type 2 DM, stopped taking Metformin  Hx of CHTN, was told to stop taking lisinopril      One ER visits  in this pregnancy for n/v. She reports headache today.  Too early for  AFP.  Desires NIPT, will order at next visit. CF.  Reports No FM, VB, LOF, or cramping.  Denies dysuria, vaginal DC.  Pt is  and lives with family. FOB is involved. She is currently working as , discussed heavy lifting, no chemical exposure.  Pregnancy is unplanned but welcomed.    O:    Vitals:    05/15/23 1013 05/15/23 1015   BP: 100/58    Weight: 180 lb    Height:  5' 4.17\"    See H&P Prenatal Physical.  Wet mount: not indicated        FHTs: 150        Fundal ht: 8 cm     A:   1.  IUP @ 8w3d BRENNEN: Estimated Date of Delivery: 23 per US         2.  S=D        3.    Patient Active Problem List    Diagnosis Date Noted    Encounter for supervision of high risk pregnancy in first trimester, antepartum 05/15/2023    H/O gastric sleeve 05/15/2023    Rh negative status during pregnancy in third trimester 2016    Blood type O- 07/15/2016    Encounter for supervision of other normal pregnancy 2016         P:  1.  GC/CT today. Pap today.         2.  Prenatal labs and UDS ordered - lab slip given        3.  Discussed diet and exercise during pregnancy. Encouraged good nutrition, and daily exercise including walking or swimming. Discussed expected weight gain during pregnancy.              4.  Discussed adequate hydration during pregnancy.        5.  NOB packet given        6.  Return to office for diabetic class and clinic       " " 7.  Complete OB US in 12 wks        8.  Pregnancy guide provided        9.  Other labs: PIH, PCR, A1c, ferritin, total iron, B12, Vit D, calcium, folate       10. Diabetic glucometer and supplies ordered       11. Pt to try Tylenol with small amount of caffeine for headache and try to drink more water       12. Rx for zofran        HPI    Review of Systems   Constitutional: Negative.    HENT: Negative.     Eyes: Negative.    Respiratory: Negative.     Cardiovascular: Negative.    Gastrointestinal: Negative.         Had gastric sleeve surgery 9 months ago   Genitourinary: Negative.    Musculoskeletal: Negative.    Skin: Negative.    Neurological:  Positive for headaches.        Reports headache not relieved by tylenol today   Endo/Heme/Allergies: Negative.    Psychiatric/Behavioral: Negative.                Objective     /58   Ht 5' 4.17\"   Wt 180 lb   LMP 02/01/2022 (Approximate)   BMI 30.73 kg/m²      Physical Exam  Vitals and nursing note reviewed.   Constitutional:       Appearance: She is well-developed.   Cardiovascular:      Rate and Rhythm: Normal rate and regular rhythm.      Heart sounds: Normal heart sounds.   Pulmonary:      Effort: Pulmonary effort is normal.      Breath sounds: Normal breath sounds.   Abdominal:      Palpations: Abdomen is soft.   Genitourinary:     Vagina: Normal.      Comments: Uterus enlarged, c/w 8 wk ga  Musculoskeletal:         General: Normal range of motion.      Cervical back: Normal range of motion and neck supple.   Skin:     General: Skin is warm and dry.   Neurological:      Mental Status: She is alert and oriented to person, place, and time.      Deep Tendon Reflexes: Reflexes are normal and symmetric.   Psychiatric:         Behavior: Behavior normal.         Thought Content: Thought content normal.         Judgment: Judgment normal.      Comments: EPDS = 15                             Assessment & Plan        1. Encounter for supervision of high risk pregnancy " in first trimester, antepartum    - PREG CNTR PRENATAL PN; Future  - THINPREP RFLX HPV ASCUS W/CTNG; Future  - URINE DRUG SCREEN W/CONF (AR); Future  - US-OB 2ND 3RD TRI COMPLETE; Future  - FERRITIN; Future  - IRON/TOTAL IRON BIND; Future  - VITAMIN D 25-HYDROXY  - CALCIUM (CA); Future  - FOLATE; Future  - HEMOGLOBIN A1C; Future  - VIT B12,  FOLIC ACID  - PREGNANCY INDUCED HYPERTENSION PROFILE-CBC W/O, BUN, CREATININE, AST, URIC ACID; Future  - PROTEIN/CREAT RATIO URINE; Future    2. H/O gastric sleeve    - PREG CNTR PRENATAL PN; Future  - THINPREP RFLX HPV ASCUS W/CTNG; Future  - URINE DRUG SCREEN W/CONF (AR); Future  - US-OB 2ND 3RD TRI COMPLETE; Future  - FERRITIN; Future  - IRON/TOTAL IRON BIND; Future  - VITAMIN D 25-HYDROXY  - CALCIUM (CA); Future  - FOLATE; Future  - HEMOGLOBIN A1C; Future  - VIT B12,  FOLIC ACID  - PREGNANCY INDUCED HYPERTENSION PROFILE-CBC W/O, BUN, CREATININE, AST, URIC ACID; Future  - PROTEIN/CREAT RATIO URINE; Future

## 2023-05-15 NOTE — PROGRESS NOTES
NOB today. ER ultrasound on 5/2/23  Last pap: not sure. Needs one one today  Phone # [phone]  Pharmacy confirmed  On PNV.  Genetic testing offered. Patient desires NIPT  c/o bad headaches. Tylenol not helping much. Nausea.  EPDS= 15

## 2023-05-19 VITALS
HEART RATE: 75 BPM | HEIGHT: 67 IN | RESPIRATION RATE: 12 BRPM | BODY MASS INDEX: 28.72 KG/M2 | DIASTOLIC BLOOD PRESSURE: 57 MMHG | OXYGEN SATURATION: 97 % | SYSTOLIC BLOOD PRESSURE: 116 MMHG | WEIGHT: 182.98 LBS | TEMPERATURE: 98.8 F

## 2023-05-19 PROCEDURE — 302449 STATCHG TRIAGE ONLY (STATISTIC)

## 2023-05-19 ASSESSMENT — FIBROSIS 4 INDEX: FIB4 SCORE: 0.26

## 2023-05-20 ENCOUNTER — HOSPITAL ENCOUNTER (EMERGENCY)
Facility: MEDICAL CENTER | Age: 28
End: 2023-05-20
Payer: MEDICAID

## 2023-05-20 PROCEDURE — 302449 STATCHG TRIAGE ONLY (STATISTIC)

## 2023-05-20 NOTE — ED TRIAGE NOTES
.  Chief Complaint   Patient presents with    Headache    Fever     27 yr 8 week pregant patient walked into triage for the above complaint. NAD explained triage process and to inform staff if symptoms get worse.

## 2023-05-20 NOTE — ED NOTES
Pt called in lobby for revitaling multiple times. No response from patient. Staff unaware that patient left. Pt did not sign AMA

## 2023-05-25 ENCOUNTER — ROUTINE PRENATAL (OUTPATIENT)
Dept: OBGYN | Facility: CLINIC | Age: 28
End: 2023-05-25
Payer: MEDICAID

## 2023-05-25 VITALS — DIASTOLIC BLOOD PRESSURE: 60 MMHG | WEIGHT: 181 LBS | BODY MASS INDEX: 28.35 KG/M2 | SYSTOLIC BLOOD PRESSURE: 94 MMHG

## 2023-05-25 DIAGNOSIS — O24.111 PREGNANCY WITH TYPE 2 DIABETES MELLITUS IN FIRST TRIMESTER: ICD-10-CM

## 2023-05-25 DIAGNOSIS — O09.91 ENCOUNTER FOR SUPERVISION OF HIGH RISK PREGNANCY IN FIRST TRIMESTER, ANTEPARTUM: ICD-10-CM

## 2023-05-25 DIAGNOSIS — Z90.3 H/O GASTRIC SLEEVE: ICD-10-CM

## 2023-05-25 DIAGNOSIS — O10.919 CHRONIC HYPERTENSION IN PREGNANCY: ICD-10-CM

## 2023-05-25 PROCEDURE — 3074F SYST BP LT 130 MM HG: CPT | Performed by: PHYSICIAN ASSISTANT

## 2023-05-25 PROCEDURE — 0502F SUBSEQUENT PRENATAL CARE: CPT | Performed by: PHYSICIAN ASSISTANT

## 2023-05-25 PROCEDURE — 3078F DIAST BP <80 MM HG: CPT | Performed by: PHYSICIAN ASSISTANT

## 2023-05-25 ASSESSMENT — FIBROSIS 4 INDEX: FIB4 SCORE: 0.26

## 2023-05-25 NOTE — PROGRESS NOTES
Pt here for diabetic OB exam  Pt states she has been experiencing cramping, and some vag bleeding on 5/23/23.  Good# [phone]  -FM  Pharmacy confirmed   Chaperone offered not indicated  Diabetic supplies: None needed today   Pt states sh would like to reschedule GDM class.  Pt states she will do labs today after OB appt.  Pt does not have book today.

## 2023-05-25 NOTE — PROGRESS NOTES
Shauna Sloan is a 27 y.o. female  at 9w6d    Pregnancy complicated by:  Patient Active Problem List   Diagnosis    Blood type O-    Rh negative status during pregnancy in third trimester    Encounter for supervision of high risk pregnancy in first trimester, antepartum    H/O gastric sleeve    Pregnancy with type 2 diabetes mellitus in first trimester    Chronic hypertension in pregnancy         SUBJECTIVE:  Shauna Sloan is a 27 y.o.,  at 9w6d who presents for pregnancy follow-up. Good fetal movement.  Pt reports light spotting and cramping 2 days ago with none since.   Pt did not get PNL labs done.     Pt here for new DM consult.   Diet: did not attend diabetes education class, has not been monitoring glucose.   Exercise: walks at work as    PMH: Type 2 DM- previously on Metformin but stopped after gastric sleeve - last A1c 5.8%, Chronic HTN- previously on lisinopril but stopped after gastric sleeve as BP was stable off meds , Gastric sleeve 9 months ago.   OB Hx: 1 prior uncomplicated pregnancy   Family Hx: both grandparents on both sides with T2DM and HTN    OBJECTIVE:  Vital Signs: BP 94/60   LMP 2022 (Approximate)   Abdomen: soft, non-tender, gravid, no rashes, fetal heart tones are present.  Extremities: no edema    BS US per Art US Tech with fetal heart tones of 171/minute. CRL estimating gestational age of 9w1d.       Med regimen:   none      ASSESSMENT/PLAN:   - Intrauterine pregnancy of 9 weeks gestation, with normal growth.   - Diabetes Mellitus with unknown sugar control.   - Will schedule with Diabetes Education class and start monitoring glucose. Discussed importance of compliance with monitoring and recommendations. Reviewed short and long term fetal and  maternal risks of Diabetes and Hypertension in pregnancy.   - PNL, PIH labs, and TSH ordered    - Referral to perinatology for Level 2 Anatomy Scan at 20 weeks        Follow up in 2 weeks.     Call  or return for vaginal bleeding, regular contractions, leakage of fluid or decreased fetal movement. Educated on labor precautions.    Ginger Mendez P.A.-C.    I reviewed the case with Pito Lynch MD [facility] who consulted and agrees with the plan.

## 2023-06-01 ENCOUNTER — TELEPHONE (OUTPATIENT)
Dept: OBGYN | Facility: CLINIC | Age: 28
End: 2023-06-01
Payer: MEDICAID

## 2023-06-01 DIAGNOSIS — N76.0 BV (BACTERIAL VAGINOSIS): ICD-10-CM

## 2023-06-01 DIAGNOSIS — B96.89 BV (BACTERIAL VAGINOSIS): ICD-10-CM

## 2023-06-01 RX ORDER — METRONIDAZOLE 500 MG/1
500 TABLET ORAL 2 TIMES DAILY
Qty: 14 TABLET | Refills: 0 | Status: SHIPPED | OUTPATIENT
Start: 2023-06-01 | End: 2023-06-08

## 2023-06-02 NOTE — TELEPHONE ENCOUNTER
----- Message from GINA Norton sent at 6/1/2023 10:25 AM PDT -----  Pap: ASCUS, neg HPV-> routine screening in 3 yrs. BV pos; Rx for metronidazole sent, please advise to avoid milk products while taking to avoid stomach upset        Called pt and informed of pap results as above.and informed it showed positive for BV, explained this is not an STI. Pt informed she needs to start antibiotics. Should take the full Tx and to take meds with food but not with milk and to avoid alcohol and intercourse while on Tx. Pharmacy verified with pt. Pt agreed and verbalized understanding.     Pt reschedule GDM class. For 6/6/2023 at 0830. Advised to bring her meter and all supplies. Pt can bring one adult with her, no children and to eat breakfast before class. Class is 2hr long. Pt agreed and verbalized understanding.

## 2023-07-06 ENCOUNTER — ROUTINE PRENATAL (OUTPATIENT)
Dept: OBGYN | Facility: CLINIC | Age: 28
End: 2023-07-06
Payer: MEDICAID

## 2023-07-06 ENCOUNTER — HOSPITAL ENCOUNTER (OUTPATIENT)
Dept: LAB | Facility: MEDICAL CENTER | Age: 28
End: 2023-07-06
Attending: OBSTETRICS & GYNECOLOGY
Payer: MEDICAID

## 2023-07-06 VITALS — WEIGHT: 177.4 LBS | BODY MASS INDEX: 27.78 KG/M2 | SYSTOLIC BLOOD PRESSURE: 106 MMHG | DIASTOLIC BLOOD PRESSURE: 68 MMHG

## 2023-07-06 DIAGNOSIS — F41.9 ANXIETY DURING PREGNANCY: ICD-10-CM

## 2023-07-06 DIAGNOSIS — O09.91 ENCOUNTER FOR SUPERVISION OF HIGH RISK PREGNANCY IN FIRST TRIMESTER, ANTEPARTUM: ICD-10-CM

## 2023-07-06 DIAGNOSIS — O24.111 PREGNANCY WITH TYPE 2 DIABETES MELLITUS IN FIRST TRIMESTER: ICD-10-CM

## 2023-07-06 DIAGNOSIS — O99.340 ANXIETY DURING PREGNANCY: ICD-10-CM

## 2023-07-06 PROBLEM — O10.919 CHRONIC HYPERTENSION IN PREGNANCY: Status: RESOLVED | Noted: 2023-05-25 | Resolved: 2023-07-06

## 2023-07-06 LAB — GLUCOSE BLD-MCNC: 86 MG/DL (ref 65–99)

## 2023-07-06 PROCEDURE — 3078F DIAST BP <80 MM HG: CPT | Performed by: OBSTETRICS & GYNECOLOGY

## 2023-07-06 PROCEDURE — 0502F SUBSEQUENT PRENATAL CARE: CPT | Performed by: OBSTETRICS & GYNECOLOGY

## 2023-07-06 PROCEDURE — 36415 COLL VENOUS BLD VENIPUNCTURE: CPT

## 2023-07-06 PROCEDURE — 82962 GLUCOSE BLOOD TEST: CPT | Performed by: OBSTETRICS & GYNECOLOGY

## 2023-07-06 PROCEDURE — 3074F SYST BP LT 130 MM HG: CPT | Performed by: OBSTETRICS & GYNECOLOGY

## 2023-07-06 PROCEDURE — 81422 FETAL CHRMOML MICRODELTJ: CPT

## 2023-07-06 PROCEDURE — 81420 FETAL CHRMOML ANEUPLOIDY: CPT

## 2023-07-06 RX ORDER — SERTRALINE HYDROCHLORIDE 25 MG/1
25 TABLET, FILM COATED ORAL DAILY
Qty: 120 TABLET | Refills: 0 | Status: SHIPPED | OUTPATIENT
Start: 2023-07-06 | End: 2023-10-18

## 2023-07-06 ASSESSMENT — FIBROSIS 4 INDEX: FIB4 SCORE: 0.26

## 2023-07-06 NOTE — PROGRESS NOTES
Subjective     Patient ID [id number]   Shauna Sloan is a 27 y.o.  at 15w6d with a working estimated date of delivery of 2023, by Ultrasound.  Patient reports good fetal movement and no complications at the time.  No vaginal bleeding or loss of fluids.  Patient has not had her PNL labs done today.  Overall she is doing well but she does state that she has been tired and has noticed that she has had increased anxiety.  She does have a history of depression previously which was treated with medication.  She is unsure of what that medication was.  She declines any suicidal ideation.  EPDS score from last visit was a 15.    She previously was on metformin and lisinopril but states that status post her gastric sleeve in 2022 she has not needed any additional medication as her blood pressure was stable.  She states that her glucometer was not working so she has been unable to check her sugars but did get it working recently and will start checking her sugars and bring a log in in 2 weeks.    She also states she was called and told that her PAP showed BV. She is not having any new vaginal discharge, odor, vaginal itching, or any additional symptoms. At this time we will not treat her for BV.   OB History    Para Term  AB Living   2 1 1     1   SAB IAB Ectopic Molar Multiple Live Births             1      # Outcome Date GA Lbr Keanu/2nd Weight Sex Delivery Anes PTL Lv   2 Current            1 Term 10/10/16 40w4d  8 lb M Vag-Spont  N NEREIDA          Gynecology History  Past Medical History:   Diagnosis Date    Anxiety     Gynecological disorder     Irregular periods- was seeing a specialist but stopped because her insurance would not accept.    Hypertension     Psychiatric problem     Anxiety and depression    Sleep apnea     +CPAP    Type II diabetes mellitus (HCC)     on Metformin, no insulin     No past surgical history on file.  Family History   Problem Relation Age of Onset    Other  "Sister         Hep B     Diabetes Maternal Aunt     Diabetes Maternal Uncle     Diabetes Paternal Aunt     Diabetes Paternal Uncle     Diabetes Maternal Grandmother     Diabetes Maternal Grandfather     Diabetes Paternal Grandmother     Diabetes Paternal Grandfather      Social History     Socioeconomic History    Marital status:    Tobacco Use    Smoking status: Former     Types: Cigarettes     Quit date: 2020     Years since quitting: 3.5    Smokeless tobacco: Never   Vaping Use    Vaping Use: Former    Quit date: 1/1/2020   Substance and Sexual Activity    Alcohol use: Not Currently     Comment: \"one beer a month or so\".    Drug use: Not Currently     Types: Inhaled     Comment: marijuana- quit around 6/2022    Sexual activity: Yes     Partners: Male     Comment: none      Current Outpatient Medications   Medication Sig Dispense Refill    sertraline (ZOLOFT) 25 MG tablet Take 1 Tablet by mouth every day for 120 days. 120 Tablet 0    PRENATAL VIT-DOCUSATE-IRON-FA PO Take  by mouth.      Blood Glucose Meter Kit Test blood sugar as recommended by provider. Per formulary blood glucose monitoring kit. 1 Kit 0    Blood Glucose Test Strips Use one per formulary strip to test blood sugar once daily early morning before first meal then 1 hour after every meal. 100 Strip 0    Lancets Use one per formulary lancet to test blood sugar once daily early morning before first meal and 1 hour after every meal 100 Each 0    Alcohol Swabs Wipe site with prep pad prior to injection. 100 Each 3    metFORMIN (GLUCOPHAGE) 500 MG Tab Take 500 mg by mouth 2 times a day with meals. (Patient not taking: Reported on 5/15/2023)      lisinopril (PRINIVIL) 10 MG Tab Take 10 mg by mouth every day. (Patient not taking: Reported on 5/15/2023)       No current facility-administered medications for this visit.     No Known Allergies  The following portions of the patient's chart were reviewed in this encounter and updated as appropriate: "     Review of Systems  Negative except    Psychological ROS: positive for - anxiety and depression    Objective   Physical Exam  Weight: 177 lb 6.4 oz  Expected Total Weight Gain: 15 lb-25 lb   Pregravid BMI: 28.19  Blood Pressure: 106/68  Abdomen soft without any tenderness noted.   No LE edema noted.        Prenatal Labs  She has not had these done. They were reprinted today and she will get these done. NIPTs ordered today.   Anatomy US scheduled for 8/17/2023    Assessment & Plan     1. Pregnancy with type 2 diabetes mellitus in first trimester  -PNL ordered  -NIPT ordered  - Glucose monitoring-will bring her logs into her next visit  -POCT here in clinic today was 86  -Anatomy US to be performed on 8/7/2023  -recheck hematocrit at 30 weeks to r/o iron deficiency anemia      2. Anxiety during pregnancy  -will start on Zoloft 25 mg daily  -recommended cognitive behavioral therapy as well    3. Encounter for supervision of high risk pregnancy in first trimester, antepartum          Return to clinic in 2 weeks for return prenatal visit.

## 2023-07-06 NOTE — PROGRESS NOTES
OB f/u GDM.   Pt states she will do Labs as soon as possible, no issues or concerns.   Good #[phone]   Good FM  Pt. Denies VB, LOF, or UC's.   Pharmacy verified.  Diabetic supplies none needed today.  BS in clinic : 86 Pt states last ate at 10:30 am, pt ate eggs and tortilla  Chaperone offered and indicated

## 2023-07-11 DIAGNOSIS — O09.91 ENCOUNTER FOR SUPERVISION OF HIGH RISK PREGNANCY IN FIRST TRIMESTER, ANTEPARTUM: ICD-10-CM

## 2023-07-11 DIAGNOSIS — Z90.3 H/O GASTRIC SLEEVE: ICD-10-CM

## 2023-07-16 LAB
22Q112 DEL SYND Q0669: NORMAL
ANNOTATION COMMENT IMP: NORMAL
FET CHR X + Y ANEUP RISK PLAS.CFDNA QN: NORMAL
FET SEX US: NORMAL
FET TS 13 RISK PLAS.CFDNA QL: NORMAL
FET TS 18 RISK PLAS.CFDNA QL: NORMAL
FET TS 21 RISK PLAS.CFDNA QL: NORMAL
FETAL ANEUPLOIDY - TRIPLOIDY NV11651: NORMAL
FETAL FRACTION Q0204: 9.7 %
GA (DAYS): 0 D
GA (WEEKS): 14 WK
LABORATORY REPORT: NORMAL
NUMBER OF FETUSES Q0671: 1
REPORT FETAL SEX NL11652: YES

## 2023-07-17 PROBLEM — Z28.39 RUBELLA NON-IMMUNE STATUS, ANTEPARTUM: Status: ACTIVE | Noted: 2023-07-17

## 2023-07-17 PROBLEM — O09.899 RUBELLA NON-IMMUNE STATUS, ANTEPARTUM: Status: ACTIVE | Noted: 2023-07-17

## 2023-07-17 RX ORDER — LANOLIN ALCOHOL/MO/W.PET/CERES
1 CREAM (GRAM) TOPICAL DAILY
Qty: 90 TABLET | Refills: 1 | Status: SHIPPED | OUTPATIENT
Start: 2023-07-17 | End: 2023-10-18

## 2023-07-20 ENCOUNTER — ROUTINE PRENATAL (OUTPATIENT)
Dept: OBGYN | Facility: CLINIC | Age: 28
End: 2023-07-20
Payer: MEDICAID

## 2023-07-20 VITALS — WEIGHT: 176 LBS | DIASTOLIC BLOOD PRESSURE: 60 MMHG | SYSTOLIC BLOOD PRESSURE: 100 MMHG | BODY MASS INDEX: 27.57 KG/M2

## 2023-07-20 DIAGNOSIS — O24.111 PREGNANCY WITH TYPE 2 DIABETES MELLITUS IN FIRST TRIMESTER: ICD-10-CM

## 2023-07-20 LAB — GLUCOSE BLD-MCNC: 78 MG/DL (ref 65–99)

## 2023-07-20 PROCEDURE — 3078F DIAST BP <80 MM HG: CPT | Performed by: OBSTETRICS & GYNECOLOGY

## 2023-07-20 PROCEDURE — 99203 OFFICE O/P NEW LOW 30 MIN: CPT | Performed by: OBSTETRICS & GYNECOLOGY

## 2023-07-20 PROCEDURE — 3074F SYST BP LT 130 MM HG: CPT | Performed by: OBSTETRICS & GYNECOLOGY

## 2023-07-20 PROCEDURE — 82962 GLUCOSE BLOOD TEST: CPT | Performed by: OBSTETRICS & GYNECOLOGY

## 2023-07-20 ASSESSMENT — FIBROSIS 4 INDEX: FIB4 SCORE: 0.26

## 2023-07-20 NOTE — PROGRESS NOTES
"Name: Shauna Sloan  Medical Record Number:  [id number]  YOB: 1995  Date of Service: 2023    Shauna Sloan was seen today for Diabetes and history of hypertension.  The patient is a 27 y.o. , with an BRENNEN of 2023, by Ultrasound dating method.  Please see the active problem list for a full description of the issues for which the patient was evaluated for today.      She confirms today that she took Metformin after the last pregnancy but her A1C is 4.8 and glucose this am is 78.     Physical Examination: General appearance - alert, well appearing, and in no distress    The patient's past medical history and prenatal records were reviewed.  Additional issues addressed and updated today:  Patient Active Problem List   Diagnosis    Blood type O-    Rh negative status during pregnancy in first trimester    Encounter for supervision of high risk pregnancy in first trimester, antepartum    H/O gastric sleeve    Pregnancy with type 2 diabetes mellitus in first trimester    BV (bacterial vaginosis)    Anxiety during pregnancy    Rubella non-immune status, antepartum     Family History   Problem Relation Age of Onset    Other Sister         Hep B     Diabetes Maternal Aunt     Diabetes Maternal Uncle     Diabetes Paternal Aunt     Diabetes Paternal Uncle     Diabetes Maternal Grandmother     Diabetes Maternal Grandfather     Diabetes Paternal Grandmother     Diabetes Paternal Grandfather      Social History     Socioeconomic History    Marital status:    Tobacco Use    Smoking status: Former     Types: Cigarettes     Quit date:      Years since quitting: 3.5    Smokeless tobacco: Never   Vaping Use    Vaping Use: Former    Quit date: 2020   Substance and Sexual Activity    Alcohol use: Not Currently     Comment: \"one beer a month or so\".    Drug use: Not Currently     Types: Inhaled     Comment: marijuana- quit around 2022    Sexual activity: Yes     Partners: Male "     Comment: none        I am ordering a 3 hour GTT to confirm the diabetes diagnosis.  She has an anatomy scan on the 7th and her genetic screening is pending results.  I will see again in 2 weeks.    Pito Lynch Jr., M.D.

## 2023-07-20 NOTE — PROGRESS NOTES
OB follow up   No LOF or UC's. Pt states Monday she had VB spotting. Pt states to have a pinching sensation in vaginal area.   Phone # [phone] (home) [phone] (work)  Preferred pharmacy confirmed.  US 8/7   FU ON EPDS : 15 (5/15/23)   Pt states she no showed GDM class because of her work, is requesting a note asking for the day off.   Pt would like gender of th sex in a envelope.

## 2023-07-27 ENCOUNTER — ROUTINE PRENATAL (OUTPATIENT)
Dept: OBGYN | Facility: CLINIC | Age: 28
End: 2023-07-27
Payer: MEDICAID

## 2023-07-27 VITALS — SYSTOLIC BLOOD PRESSURE: 106 MMHG | DIASTOLIC BLOOD PRESSURE: 58 MMHG | WEIGHT: 178 LBS | BODY MASS INDEX: 27.88 KG/M2

## 2023-07-27 DIAGNOSIS — O09.91 ENCOUNTER FOR SUPERVISION OF HIGH RISK PREGNANCY IN FIRST TRIMESTER, ANTEPARTUM: ICD-10-CM

## 2023-07-27 DIAGNOSIS — Z67.91 RH NEGATIVE STATUS DURING PREGNANCY IN FIRST TRIMESTER: ICD-10-CM

## 2023-07-27 DIAGNOSIS — Z86.39 HX OF DIABETES MELLITUS: ICD-10-CM

## 2023-07-27 DIAGNOSIS — O26.891 RH NEGATIVE STATUS DURING PREGNANCY IN FIRST TRIMESTER: ICD-10-CM

## 2023-07-27 DIAGNOSIS — Z86.32 HX OF GESTATIONAL DIABETES IN PRIOR PREGNANCY, CURRENTLY PREGNANT: ICD-10-CM

## 2023-07-27 DIAGNOSIS — O24.111 PREGNANCY WITH TYPE 2 DIABETES MELLITUS IN FIRST TRIMESTER: ICD-10-CM

## 2023-07-27 DIAGNOSIS — O09.299 HX OF GESTATIONAL DIABETES IN PRIOR PREGNANCY, CURRENTLY PREGNANT: ICD-10-CM

## 2023-07-27 LAB — GLUCOSE BLD-MCNC: 95 MG/DL (ref 65–99)

## 2023-07-27 PROCEDURE — 3074F SYST BP LT 130 MM HG: CPT | Performed by: PHYSICIAN ASSISTANT

## 2023-07-27 PROCEDURE — 3078F DIAST BP <80 MM HG: CPT | Performed by: PHYSICIAN ASSISTANT

## 2023-07-27 PROCEDURE — 82962 GLUCOSE BLOOD TEST: CPT | Performed by: PHYSICIAN ASSISTANT

## 2023-07-27 PROCEDURE — 0502F SUBSEQUENT PRENATAL CARE: CPT | Performed by: PHYSICIAN ASSISTANT

## 2023-07-27 ASSESSMENT — FIBROSIS 4 INDEX: FIB4 SCORE: 0.26

## 2023-07-27 NOTE — PROGRESS NOTES
OB f/u GDM. Good # [phone]  Good FM  Pt. Denies VB, LOF, or UC's.   Pharmacy verified.  Diabetic supplies none needed today.  BS in clinic : 95 Pt states last ate at 3 cookies and milk 15 minutes ago  Chaperone offered and not indicated  Pt states she is not writing her blood sugars down  NIPT needs to be done by 8/24/23 but she said she already did it

## 2023-07-27 NOTE — PROGRESS NOTES
Shauna Sloan is a 27 y.o. female  at 18w6d    Pregnancy complicated by:  Patient Active Problem List   Diagnosis    Blood type O-    Rh negative status during pregnancy in first trimester    Encounter for supervision of high risk pregnancy in first trimester, antepartum    H/O gastric sleeve    Pregnancy with type 2 diabetes mellitus in first trimester    BV (bacterial vaginosis)    Anxiety during pregnancy    Rubella non-immune status, antepartum         SUBJECTIVE:  Shauna Sloan is a 27 y.o.,  at 18w6d who presents for pregnancy follow-up. Good fetal movement.  Denies VB, LOF, CTX.    Pt in MFM clinic today. HX of T2DM that resolved after gastric sleeve. A1c was low 4.8%. Pt has randomly checked sugars with 'normal readings' but cannot recall and has not been documenting.      OBJECTIVE:  Vital Signs: /58   Wt 178 lb   LMP 2022 (Approximate)   BMI 27.88 kg/m²   Fundal height: 18cm, which is S=D.   Fetal heart tones: 152/minute.   Abdomen: soft, non-tender, gravid, no rashes, fetal heart tones are present, fundal height is consistent with dates  Extremities: no edema      Med regimen:   None     - A1c: 4.8%  - Rh: Negative   - NIPT: low risk female       ASSESSMENT/PLAN:   - Intrauterine pregnancy of 18 weeks gestation, with normal growth.   - Discussed with pt and  with A1c so low and in normal range she does not need to do early glucose testing and is considered a routine OB pt. She will still do her 1hGTT at 26 weeks with third trimester labs. If she failed 1h she can attempt 3h but this may be difficult to tolerate with her gastric sleeve. If needed she can track sugars. Otherwise, pt can return to routine OB visits.    - Anatomy scan 23    Follow up in 4 weeks.     Call or return for vaginal bleeding, regular contractions, leakage of fluid or decreased fetal movement. Educated on labor precautions.    Ginger Mendez P.A.-C.    I reviewed the case with  Pito Lynch MD [facility] who consulted and agrees with the plan.

## 2023-08-07 ENCOUNTER — APPOINTMENT (OUTPATIENT)
Dept: RADIOLOGY | Facility: IMAGING CENTER | Age: 28
End: 2023-08-07
Attending: NURSE PRACTITIONER
Payer: MEDICAID

## 2023-08-07 DIAGNOSIS — Z90.3 H/O GASTRIC SLEEVE: ICD-10-CM

## 2023-08-07 DIAGNOSIS — O09.91 ENCOUNTER FOR SUPERVISION OF HIGH RISK PREGNANCY IN FIRST TRIMESTER, ANTEPARTUM: ICD-10-CM

## 2023-08-07 PROCEDURE — 76805 OB US >/= 14 WKS SNGL FETUS: CPT | Mod: TC | Performed by: NURSE PRACTITIONER

## 2023-08-24 ENCOUNTER — APPOINTMENT (OUTPATIENT)
Dept: OBGYN | Facility: CLINIC | Age: 28
End: 2023-08-24
Payer: MEDICAID

## 2023-08-24 ENCOUNTER — ROUTINE PRENATAL (OUTPATIENT)
Dept: OBGYN | Facility: CLINIC | Age: 28
End: 2023-08-24
Payer: MEDICAID

## 2023-08-24 VITALS — DIASTOLIC BLOOD PRESSURE: 52 MMHG | BODY MASS INDEX: 28.22 KG/M2 | WEIGHT: 180.2 LBS | SYSTOLIC BLOOD PRESSURE: 90 MMHG

## 2023-08-24 DIAGNOSIS — O09.91 ENCOUNTER FOR SUPERVISION OF HIGH RISK PREGNANCY IN FIRST TRIMESTER, ANTEPARTUM: ICD-10-CM

## 2023-08-24 PROBLEM — B96.89 BV (BACTERIAL VAGINOSIS): Status: RESOLVED | Noted: 2023-06-01 | Resolved: 2023-08-24

## 2023-08-24 PROBLEM — N76.0 BV (BACTERIAL VAGINOSIS): Status: RESOLVED | Noted: 2023-06-01 | Resolved: 2023-08-24

## 2023-08-24 PROCEDURE — 0502F SUBSEQUENT PRENATAL CARE: CPT | Performed by: PHYSICIAN ASSISTANT

## 2023-08-24 PROCEDURE — 3074F SYST BP LT 130 MM HG: CPT | Performed by: PHYSICIAN ASSISTANT

## 2023-08-24 PROCEDURE — 3078F DIAST BP <80 MM HG: CPT | Performed by: PHYSICIAN ASSISTANT

## 2023-08-24 ASSESSMENT — FIBROSIS 4 INDEX: FIB4 SCORE: 0.26

## 2023-08-24 NOTE — PROGRESS NOTES
Pt. Here for OB/FU. Reports Good FM.   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

## 2023-08-24 NOTE — LETTER
August 24, 2023       Patient: Shauna Sloan   YOB: 1995   Date of Visit: 8/24/2023         To Whom It May Concern:    In my medical opinion, I recommend that Shauna Sloan missed work on August 18, 19 and 20 due to a complication with pregnancy, please excuse. Thank you.     If you have any questions or concerns, please don't hesitate to call [phone]          Sincerely,          MARY Bazan.

## 2023-08-28 ENCOUNTER — APPOINTMENT (OUTPATIENT)
Dept: OBGYN | Facility: CLINIC | Age: 28
End: 2023-08-28
Payer: MEDICAID

## 2023-09-21 ENCOUNTER — ROUTINE PRENATAL (OUTPATIENT)
Dept: OBGYN | Facility: CLINIC | Age: 28
End: 2023-09-21
Payer: MEDICAID

## 2023-09-21 VITALS — DIASTOLIC BLOOD PRESSURE: 60 MMHG | SYSTOLIC BLOOD PRESSURE: 100 MMHG | WEIGHT: 185 LBS | BODY MASS INDEX: 28.98 KG/M2

## 2023-09-21 DIAGNOSIS — O09.92 SUPERVISION OF HIGH-RISK PREGNANCY, SECOND TRIMESTER: Primary | ICD-10-CM

## 2023-09-21 PROCEDURE — 3078F DIAST BP <80 MM HG: CPT | Performed by: NURSE PRACTITIONER

## 2023-09-21 PROCEDURE — 0502F SUBSEQUENT PRENATAL CARE: CPT | Performed by: NURSE PRACTITIONER

## 2023-09-21 PROCEDURE — 3074F SYST BP LT 130 MM HG: CPT | Performed by: NURSE PRACTITIONER

## 2023-09-21 ASSESSMENT — FIBROSIS 4 INDEX: FIB4 SCORE: 0.26

## 2023-09-21 NOTE — PROGRESS NOTES
S) Pt is a 27 y.o.   at 26w6d  gestation. Routine prenatal care today. No concerns today. All caught up with labs and US. 3rd trimester labs ordered and discussed today. Was previously diabetic, but resolved after sleeve surgery. Was told not to test anymore. Tdap, BTL, and FKC to be discussed next visit. PTL precautions reviewed, all questions answered.    Fetal movement Normal  Cramping no  VB no  LOF no   Denies dysuria. Generally feels well today. Good self-care activities identified. Denies headaches, swelling, visual changes, or epigastric pain .     O) /60   Wt 185 lb         Labs:       PNL: WNL, rub NI       GCT: Ordered today        AFP: Not Examined, but normal AFP       GBS: N/A       Pertinent ultrasound -        2023- Survey WNL, FITZ 10cm, c/w prev dating. EFW 10%    A) IUP at 26w6d       S=D         Patient Active Problem List    Diagnosis Date Noted    Hx of diabetes mellitus 2023    Hx of gestational diabetes in prior pregnancy, currently pregnant 2023    Rubella non-immune status, antepartum 2023    Anxiety during pregnancy 2023    Supervision of high-risk pregnancy, second trimester 05/15/2023    H/O gastric sleeve 05/15/2023    Rh negative status during pregnancy in first trimester 2016          SVE: deferred       Chaperone offered: n/a         TDAP: no       FLU: no        BTL: no       :  n/a       C/S Consent:  n/a       IOL or C/S scheduled: no       LAST PAP: 2023- ASCUS, neg HPV- repeat in 3 years         P) s/s ptl vs general discomforts. Fetal movements reviewed. General ed and anticipatory guidance. Nutrition/exercise/vitamin. Plans breast Plans pp contraception- unsure  Continue PNV.

## 2023-09-21 NOTE — PROGRESS NOTES
Pt here today for OB follow up  Pt states no complaints  Reports +FM  Good # [phone] (home) [phone] (work)   Pharmacy Confirmed.  Chaperone offered and declined.

## 2023-10-07 ENCOUNTER — HOSPITAL ENCOUNTER (EMERGENCY)
Facility: MEDICAL CENTER | Age: 28
End: 2023-10-07
Attending: OBSTETRICS & GYNECOLOGY | Admitting: OBSTETRICS & GYNECOLOGY
Payer: MEDICAID

## 2023-10-07 ENCOUNTER — APPOINTMENT (OUTPATIENT)
Dept: RADIOLOGY | Facility: MEDICAL CENTER | Age: 28
End: 2023-10-07
Attending: OBSTETRICS & GYNECOLOGY
Payer: MEDICAID

## 2023-10-07 VITALS
RESPIRATION RATE: 16 BRPM | WEIGHT: 185 LBS | DIASTOLIC BLOOD PRESSURE: 59 MMHG | HEIGHT: 66 IN | HEART RATE: 71 BPM | SYSTOLIC BLOOD PRESSURE: 107 MMHG | TEMPERATURE: 99.2 F | BODY MASS INDEX: 29.73 KG/M2

## 2023-10-07 LAB — FIBRONECTIN FETAL SPEC QL: NEGATIVE

## 2023-10-07 PROCEDURE — 82731 ASSAY OF FETAL FIBRONECTIN: CPT

## 2023-10-07 PROCEDURE — 76817 TRANSVAGINAL US OBSTETRIC: CPT

## 2023-10-07 PROCEDURE — 59025 FETAL NON-STRESS TEST: CPT

## 2023-10-07 PROCEDURE — 99284 EMERGENCY DEPT VISIT MOD MDM: CPT

## 2023-10-07 ASSESSMENT — FIBROSIS 4 INDEX
FIB4 SCORE: 0.26
FIB4 SCORE: 0.26

## 2023-10-07 NOTE — ED TRIAGE NOTES
Complaint of pelvic pain cramping and light spotting OB ED EVALUATION NOTE    SUBJECTIVE:  Shauna Sloan is a 27 y.o.,  at 29w1d who presents for pelvic pain.  Patient had intercourse 2 days ago.  Patient notes the cramping pain is increased when she is bending over she works as a  at the [employer].   Patient has noticed that the baby seems to be moving less.    I personally reviewed the past medical and surgical histories, as well as the problem list.    OBJECTIVE:  Vital Signs:   Vitals:    10/07/23 1550   Resp: 16   Temp: 37.3 °C (99.2 °F)     GEN: NAD  Abd: soft, NT, gravid  Ext: no edema    Pelvic: Deferred, ultrasound reveals cervix 3.2 cm and closed, fetal fibronectin negative, nurse exam with speculum revealed no intravaginal bleeding and there was no blood on the fetal fibronectin swab    NST read: Baseline 135 bpm, moderate variability, 2 or more accelerations 15 x 15 within 20 minutes  [place]: Initially mild irritability was seen, but after resting for 20 minutes no contractions or irritability were seen on the toco    LABS / IMAGING:  Results for orders placed or performed in visit on 23   POCT Glucose   Result Value Ref Range    Glucose - Accu-Ck 95 65 - 99 mg/dL     Labs Reviewed   FETAL FIBRONECTIN   POCT FETAL NONSTRESS TEST   POCT URINALYSIS      US-OB LIMITED WITH TRANSVAGINAL (COMBO)   Final Result      Single intrauterine pregnancy of an estimated gestational age of 28 weeks, 3 days with an estimated date of delivery of 2023.   No finding to suggest placental abruption.           ASSESSMENT AND PLAN:  27 y.o.    Patient Active Problem List    Diagnosis Date Noted    Hx of diabetes mellitus 2023    Hx of gestational diabetes in prior pregnancy, currently pregnant 2023    Rubella non-immune status, antepartum 2023    Anxiety during pregnancy 2023    Supervision of high-risk pregnancy, second trimester 05/15/2023    H/O gastric  sleeve 05/15/2023    Rh negative status during pregnancy in first trimester 2016       The patient was instructed to follow up in the office for her scheduled  visit on 2023.  Patient works as a  and we discussed my recommendation that she have a conversation to see if lighter duty can be arranged for her at work as she is now in her third trimester.  Patient is reassured the ultrasound and the fetal heart rate tracing and the fetal fibronectin are normal.  After rest patient is feeling better and is stable for discharge home.  The most likely cause of her discomfort was round ligament pain. She was counseled to call or return for vaginal bleeding, regular contractions, leakage of fluid or decreased fetal movement.    Gabriela Odonnell M.D.

## 2023-10-08 NOTE — PROGRESS NOTES
27 y.o.  EDC 23 EGA 29.1 here to LDA3 c/o spottiing, cramping, and decreased fetal movement. Pt is a  at the [employer] and states that the cramping is worse when she is working. Pt reports sexual intercourse 2 days ago. Denies LOF. VSS> Report to Dr. Odonnell order for SSE, FFN. US with cervical length, FITZ.   SSE no active bleeding in vaginal vault. FFN obtained and sent. US performed. FITZ 15.2 cm, cervical length 3.2 cm. EFW 1194 grams, 12%. Report to Dr. Odonnell. Discharge order received.. Pt encouraged to get a pregnancy support band and to discuss light duty with employer and MD at next appointment. Discharge teaching performed. Discharged to home, ambulatory with FOB.

## 2023-10-18 ENCOUNTER — ROUTINE PRENATAL (OUTPATIENT)
Dept: OBGYN | Facility: CLINIC | Age: 28
End: 2023-10-18
Payer: MEDICAID

## 2023-10-18 VITALS — WEIGHT: 189 LBS | SYSTOLIC BLOOD PRESSURE: 120 MMHG | DIASTOLIC BLOOD PRESSURE: 60 MMHG | BODY MASS INDEX: 30.51 KG/M2

## 2023-10-18 DIAGNOSIS — Z86.79 HISTORY OF CHRONIC HYPERTENSION: ICD-10-CM

## 2023-10-18 DIAGNOSIS — Z86.59 HISTORY OF ANXIETY: ICD-10-CM

## 2023-10-18 DIAGNOSIS — O09.299 HX OF GESTATIONAL DIABETES IN PRIOR PREGNANCY, CURRENTLY PREGNANT: ICD-10-CM

## 2023-10-18 DIAGNOSIS — Z86.32 HX OF GESTATIONAL DIABETES IN PRIOR PREGNANCY, CURRENTLY PREGNANT: ICD-10-CM

## 2023-10-18 PROCEDURE — 0502F SUBSEQUENT PRENATAL CARE: CPT | Performed by: NURSE PRACTITIONER

## 2023-10-18 PROCEDURE — 90715 TDAP VACCINE 7 YRS/> IM: CPT | Performed by: NURSE PRACTITIONER

## 2023-10-18 PROCEDURE — 90686 IIV4 VACC NO PRSV 0.5 ML IM: CPT | Performed by: NURSE PRACTITIONER

## 2023-10-18 PROCEDURE — 3078F DIAST BP <80 MM HG: CPT | Performed by: NURSE PRACTITIONER

## 2023-10-18 PROCEDURE — 3074F SYST BP LT 130 MM HG: CPT | Performed by: NURSE PRACTITIONER

## 2023-10-18 PROCEDURE — 90472 IMMUNIZATION ADMIN EACH ADD: CPT | Performed by: NURSE PRACTITIONER

## 2023-10-18 PROCEDURE — 90471 IMMUNIZATION ADMIN: CPT | Performed by: NURSE PRACTITIONER

## 2023-10-18 ASSESSMENT — FIBROSIS 4 INDEX: FIB4 SCORE: 0.26

## 2023-10-18 NOTE — PROGRESS NOTES
SUBJECTIVE:  Pt is a 27 y.o.   at 30w5d  gestation. Presents today for follow-up prenatal care. Reports good  fetal movement. Denies regular cramping/contractions, bleeding or leaking of fluid. Denies dysuria, headaches, N/V. Generally feels well today except some round ligament discomforts.  Reports her moods are stable overall.     OBJECTIVE:  - See prenatal vitals flow  -   Vitals:    10/18/23 1105   BP: 120/60   Weight: 189 lb                 ASSESSMENT:   - IUP at 30w5d    - S=D   -   Patient Active Problem List    Diagnosis Date Noted    History of chronic hypertension 10/18/2023    Hx of gestational diabetes in prior pregnancy, currently pregnant 2023    Rubella non-immune status, antepartum 2023    Anxiety during pregnancy 2023    H/O gastric sleeve 05/15/2023    Rh negative status during pregnancy in first trimester 2016         PLAN:  - S/sx pregnancy and labor warning signs vs general discomforts discussed  - Fetal movements and/or kick counts reviewed   - Adequate hydration reinforced  - Nutrition/exercise/vitamin education; continue PNV  - Still needs to get third tri labs done  - Recommended support belt  - S/p Tdap , flu and rhogam today    - Anticipatory guidance given  - RTC in 2 weeks for follow-up prenatal care

## 2023-10-18 NOTE — LETTER
Cuente los Movimientos de zamora Bebé  Otro paso importante para la dallin de zamora bebé    Shauna Sloan     [facility]            Dept: [phone]    ¿Cuántas semanas tiene de embarazo? 30w5d                    Xiomara debe usar bc diagrama    Doni manera en que zamora doctor puede controlar a dallin de zamora bebé es sabiendo cuantas veces se mueve zamora bebé en el útero, o por medio de las “pataditas”.  Usted podrá ayudarle a zamora médico al usar cada día el siguiente diagrama.    Cada día, usted debe prestar atención a cuantas horas le lleva a zamora bebé moverse 10 veces.  Comience a contar en la mañana, lo antes posible después de haberse levantado.    Primeramente, escriba la hora en que se mueve zamora bebé, hasta llegar a 10 veces.  Colóquele un check o palomita a cada cuadrito cada vez que zaomra bebé se mueva hasta que complete 10 veces.  Escriba la hora cuando termine de contar 10 veces en la última columna.  Sume el total del tiempo que le llevó contar los 10 movimientos.  Finalmente, complete el cuadrito de cuantas horas le llevó hacerlo.    Después de angelika contado los 10 movimientos, ya no tendrá que contar los demás movimientos por el wm del día.  A la mañana siguiente, comience a contar de nuevo cuantas veces se mueve el bebé desde el momento en que se levante.    ¿Qué tendría que considerarse un “movimiento”?  Es difícil de decirlo porque es distinto de doni madre a otra, y de un embarazo a otro.  Lo importante es que cuente el movimiento de la misma manera armando el transcurso de zamora embarazo.  Si tiene preguntas adicionales, pregúntele a zamora doctor.    ¡Cuente cuidadosamente cada día!     MUESTRA:  Semana 28    ¿Cuántas horas le ha llevado sentir 10 movimientos?        Hora de Inicio     1     2     3     4     5     6     7     8     9     10   Hora de Finlizar   Karin. 8:20           11:40   Mar.               Mié.               Iesha.               Kathryne.               Nancy.               Steve.                  IMPORTANTE:  Usted debe contactar a zamora doctor si le lleva más de 2 horas sentir 10 movimientos de zamora bebé.    Cada mañana, escriba la hora de inicio y comience a contar los movimientos de zamora bebé.  Hágalo colocándole un check o palomita a cada cuadrito cada vez que sienta un movimiento de zamora bebé.  Cuando haya sentido 10 “pataditas”, escriba la hora en que terminó de contar en la última columna.  Luego, complete en la cajita (arriba de la brendon de check o palomita) el número total de horas que le llevó hacerlo.  Asegúrese de leer completamente las instrucciones en la página anterior.

## 2023-10-18 NOTE — PROGRESS NOTES
Pt. Here for OB F/U.   Reports good FM.   Pt. Denies VB, LOF, or UC's.   PETEY explained and given today.  Tdap and Flu given.   BTL declined.   Rhogam given.   Chaperone offered.   Pt states she has no concerns.            ID : [id number]

## 2023-11-08 ENCOUNTER — TELEPHONE (OUTPATIENT)
Dept: OBGYN | Facility: CLINIC | Age: 28
End: 2023-11-08

## 2023-11-08 ENCOUNTER — ROUTINE PRENATAL (OUTPATIENT)
Dept: OBGYN | Facility: CLINIC | Age: 28
End: 2023-11-08
Payer: MEDICAID

## 2023-11-08 VITALS — WEIGHT: 190 LBS | BODY MASS INDEX: 30.67 KG/M2 | DIASTOLIC BLOOD PRESSURE: 68 MMHG | SYSTOLIC BLOOD PRESSURE: 112 MMHG

## 2023-11-08 DIAGNOSIS — O26.843 UTERINE SIZE-DATE DISCREPANCY IN THIRD TRIMESTER: Primary | ICD-10-CM

## 2023-11-08 DIAGNOSIS — Z34.80 SUPERVISION OF OTHER NORMAL PREGNANCY, ANTEPARTUM: ICD-10-CM

## 2023-11-08 PROCEDURE — 3078F DIAST BP <80 MM HG: CPT | Performed by: OBSTETRICS & GYNECOLOGY

## 2023-11-08 PROCEDURE — 3074F SYST BP LT 130 MM HG: CPT | Performed by: OBSTETRICS & GYNECOLOGY

## 2023-11-08 PROCEDURE — 0502F SUBSEQUENT PRENATAL CARE: CPT | Performed by: OBSTETRICS & GYNECOLOGY

## 2023-11-08 ASSESSMENT — FIBROSIS 4 INDEX: FIB4 SCORE: 0.26

## 2023-11-08 NOTE — PROGRESS NOTES
S: Pt presents for routine OB follow up. Good fetal movement. No contractions, vaginal bleeding, or leakage of fluid. States she has normal white discharge without irritation or smell. Desires a work note today to allow for light duty and working only 3 days per week. Has not completed glucose test, but plans to complete. Questions answered.    O: /68   Wt 190 lb   LMP 2022 (Approximate)   BMI 30.67 kg/m²   Patients' weight gain, fluid intake and exercise level discussed.  Vitals, fundal height , fetal position, and FHR reviewed on flowsheet    Lab:No results found for this or any previous visit (from the past 336 hour(s)).    A/P:  27 y.o.  at 33w5d presents for routine obstetric follow-up.    Diagnoses and all orders for this visit:    Supervision of other normal pregnancy, antepartum  - has not completed glucose test, plans to complete    Uterine size-date discrepancy in third trimester  - US ordered for size less than dates, measuring 31 weeks  -     US-OB LIMITED GROWTH FOLLOW UP; Future    Work note given.   Labor precautions given.  Rtc in 2wk unless indicated sooner.    :  [id number]    Renita Will M.D.

## 2023-11-08 NOTE — TELEPHONE ENCOUNTER
Pt employer [employer] called to get clarification on a work note given to pt.  They needed to know how long her breaks should be  Info was given to employer of pt and no further questions asked

## 2023-11-08 NOTE — LETTER
To whom it may concern,     Please allow for light duty during the third trimester of pregnancy including no heavy lifting greater than 20 lbs and frequent breaks every 2 hours. Patient would also like to work only 3 days a week during her third trimester. Due date is 12/22/2023.    Please call with any concerns,    Renita Will MD

## 2023-11-22 ENCOUNTER — ROUTINE PRENATAL (OUTPATIENT)
Dept: OBGYN | Facility: CLINIC | Age: 28
End: 2023-11-22
Payer: MEDICAID

## 2023-11-22 VITALS — WEIGHT: 194 LBS | BODY MASS INDEX: 31.31 KG/M2 | DIASTOLIC BLOOD PRESSURE: 70 MMHG | SYSTOLIC BLOOD PRESSURE: 110 MMHG

## 2023-11-22 DIAGNOSIS — N63.21 MASS OF UPPER OUTER QUADRANT OF LEFT BREAST: ICD-10-CM

## 2023-11-22 DIAGNOSIS — O09.893 SUPERVISION OF OTHER HIGH RISK PREGNANCIES, THIRD TRIMESTER: Primary | ICD-10-CM

## 2023-11-22 PROCEDURE — 3078F DIAST BP <80 MM HG: CPT | Performed by: NURSE PRACTITIONER

## 2023-11-22 PROCEDURE — 0502F SUBSEQUENT PRENATAL CARE: CPT | Performed by: NURSE PRACTITIONER

## 2023-11-22 PROCEDURE — 3074F SYST BP LT 130 MM HG: CPT | Performed by: NURSE PRACTITIONER

## 2023-11-22 ASSESSMENT — FIBROSIS 4 INDEX: FIB4 SCORE: 0.26

## 2023-11-22 NOTE — PROGRESS NOTES
S) Pt is a 27 y.o.   at 35w5d  gestation. Routine prenatal care today. No concerns today. States she did glucose testing at [facility]- will look for results. Other labs and US up to date. Has growth scan scheduled for 2023. She is concerned about how small her baby is. Will check with  and see if we can get this scheduled sooner. Also has noticed a large mass in her left breast. Denies tenderness, redness, warmth, or lactation symptoms. Tdap and flu done. GBS next visit. Labor precautions reviewed, all questions answered.    Fetal movement Normal  Cramping no  VB no  LOF no   Denies dysuria. Generally feels well today. Good self-care activities identified. Denies headaches, swelling, visual changes, or epigastric pain .     O) /70   Wt 194 lb         Labs:       PNL: WNL       GCT: States she did them last week, no results, did have early HgbA1C which was 4.8        AFP: Not Examined, but did have LR NIPT       GBS: N/A       Pertinent ultrasound -        2023- Survey WNL, FITZ 10cm, c/w prev dating. EFW 19%    Has growth scan scheduled 2023    A) IUP at 35w5d       S=D    Left breast with large mass in upper middle area. Approx 5cm x 6cm. Non-tender, mobile. Not able to express milk or cause lactation symptoms. Breast US ordered today         Patient Active Problem List    Diagnosis Date Noted    Supervision of other high risk pregnancies, third trimester 2023    History of chronic hypertension 10/18/2023    History of anxiety and depression in this pregnancy: now improved 10/18/2023    Hx of gestational diabetes in prior pregnancy, currently pregnant 2023    Rubella non-immune status, antepartum 2023    H/O gastric sleeve 05/15/2023    Rh negative status during pregnancy in first trimester 2016          SVE: deferred       Chaperone offered: n/a         TDAP: yes       FLU: yes        BTL: no       :  n/a       C/S Consent:  n/a       IOL or C/S  scheduled: no       LAST PAP: 5/25/2023- ASCUS, neg HPV. Repeat in 3 years         P) s/s ptl vs general discomforts. Fetal movements reviewed. General ed and anticipatory guidance. Nutrition/exercise/vitamin. Plans breast Plans pp contraception- unsure  Continue PNV.

## 2023-11-27 DIAGNOSIS — O09.92 SUPERVISION OF HIGH-RISK PREGNANCY, SECOND TRIMESTER: ICD-10-CM

## 2023-11-28 ENCOUNTER — ROUTINE PRENATAL (OUTPATIENT)
Dept: OBGYN | Facility: CLINIC | Age: 28
End: 2023-11-28
Payer: MEDICAID

## 2023-11-28 VITALS — DIASTOLIC BLOOD PRESSURE: 58 MMHG | BODY MASS INDEX: 31.57 KG/M2 | SYSTOLIC BLOOD PRESSURE: 106 MMHG | WEIGHT: 195.6 LBS

## 2023-11-28 DIAGNOSIS — Z34.83 ENCOUNTER FOR SUPERVISION OF OTHER NORMAL PREGNANCY, THIRD TRIMESTER: Primary | ICD-10-CM

## 2023-11-28 PROCEDURE — 0502F SUBSEQUENT PRENATAL CARE: CPT | Performed by: NURSE PRACTITIONER

## 2023-11-28 PROCEDURE — 3078F DIAST BP <80 MM HG: CPT | Performed by: NURSE PRACTITIONER

## 2023-11-28 PROCEDURE — 3074F SYST BP LT 130 MM HG: CPT | Performed by: NURSE PRACTITIONER

## 2023-11-28 RX ORDER — FERROUS SULFATE 325(65) MG
325 TABLET ORAL DAILY
Qty: 60 TABLET | Refills: 0 | Status: SHIPPED | OUTPATIENT
Start: 2023-11-28

## 2023-11-28 ASSESSMENT — FIBROSIS 4 INDEX: FIB4 SCORE: 0.26

## 2023-11-28 NOTE — PROGRESS NOTES
S: Breast US 12/12/23, growth US 12/14/23.  Mass to breast is unchanged from last appt.  Good FM, no concerns today.      O: Vitals see flows     Vitals:    11/28/23 0958   BP: 106/58            Pertinent Lab Results: mild anemia    A: Shaunajigna Sloan IUP at 36w4d     Patient Active Problem List    Diagnosis Date Noted    Supervision of other high risk pregnancies, third trimester 11/22/2023    History of chronic hypertension 10/18/2023    History of anxiety and depression in this pregnancy: now improved 10/18/2023    Hx of gestational diabetes in prior pregnancy, currently pregnant 07/27/2023    Rubella non-immune status, antepartum 07/17/2023    H/O gastric sleeve 05/15/2023    Rh negative status during pregnancy in first trimester 07/19/2016          P: Studies/Labs to order today: GBS, directed to start on iron supplementation   Studies/Labsfor next visit: VE if desires   Follow-up: 1 wks    Precautions reviewed with patient appropriate for gestational age.

## 2023-11-28 NOTE — PROGRESS NOTES
Pt. here for Ob f/u and GBS today. Good # [phone]  Good FM  U/S 12/14/2023  Pt states has been having increase in discharge denies itching, burning or odor.

## 2023-12-01 DIAGNOSIS — Z34.83 ENCOUNTER FOR SUPERVISION OF OTHER NORMAL PREGNANCY, THIRD TRIMESTER: ICD-10-CM

## 2023-12-07 ENCOUNTER — ROUTINE PRENATAL (OUTPATIENT)
Dept: OBGYN | Facility: CLINIC | Age: 28
End: 2023-12-07
Payer: MEDICAID

## 2023-12-07 VITALS — SYSTOLIC BLOOD PRESSURE: 100 MMHG | DIASTOLIC BLOOD PRESSURE: 60 MMHG | BODY MASS INDEX: 30.99 KG/M2 | WEIGHT: 192 LBS

## 2023-12-07 DIAGNOSIS — O09.893 SUPERVISION OF OTHER HIGH RISK PREGNANCIES, THIRD TRIMESTER: ICD-10-CM

## 2023-12-07 DIAGNOSIS — O36.8130 DECREASED FETAL MOVEMENTS IN THIRD TRIMESTER, SINGLE OR UNSPECIFIED FETUS: ICD-10-CM

## 2023-12-07 DIAGNOSIS — O26.843 SIZE OF FETUS INCONSISTENT WITH DATES IN THIRD TRIMESTER: ICD-10-CM

## 2023-12-07 LAB
NST ACOUSTIC STIMULATION: NORMAL
NST ACTION NECESSARY: NORMAL
NST ASSESSMENT: NORMAL
NST BASELINE: NORMAL
NST INDICATIONS: NORMAL
NST OTHER DATA: NORMAL
NST READ BY: NORMAL
NST RETURN: NORMAL
NST UTERINE ACTIVITY: NORMAL

## 2023-12-07 PROCEDURE — 3074F SYST BP LT 130 MM HG: CPT

## 2023-12-07 PROCEDURE — 3078F DIAST BP <80 MM HG: CPT

## 2023-12-07 PROCEDURE — 0502F SUBSEQUENT PRENATAL CARE: CPT

## 2023-12-07 ASSESSMENT — FIBROSIS 4 INDEX: FIB4 SCORE: 0.26

## 2023-12-07 NOTE — PROGRESS NOTES
Subjective     Shauna Sloan is a 27 y.o. female who presents with No chief complaint on file.            HPI    ROS           Objective     LMP 02/01/2022 (Approximate)      Physical Exam                        Assessment & Plan        There are no diagnoses linked to this encounter.

## 2023-12-07 NOTE — PROGRESS NOTES
OB follow up   + fetal movement.  No VB, LOF or UC's.   Phone # [phone] (home)   Preferred pharmacy confirmed.  GBS(-)   US- 12/14/23     Pt states baby kicks 3-4 times a day. Pt states vaginal pain since yesterday.

## 2023-12-07 NOTE — PROGRESS NOTES
S: Pt is a 27 y.o.  at 37w6d gestation here today for routine prenatal care. Reports feeling less movement than normal and has not been doing kick counts. Concerns today include lower pelvic pressure that started last week.    She started taking PO iron once daily after last visit.     Pt reports fetal movement; vaginal bleeding, and leaking of fluid. Reports few contractions over the weekend. Denies dysuria. Denies headache, visual changes, or epigastric pain.     Breast US scheduled  for breast mass. Growth US  for size<dates at previous visit.        O: There were no vitals taken for this visit.   *see prenatal flowsheet*     Labs:        PNL:   ABO: O neg  Antibodies: neg  H&H: 10.3, 32.1  Plt: 354  HIV: NR  Hep B: NR  Hep C: NR  Rubella: non-immune  RPR: NR  GC/GT: neg  Ucx: neg       GCT: 96       AFP: Not examined       GBS: negative       Ultrasound: Anatomy WNL, EFW 19%ile    NST: , moderte variability, +accels, no decels    A: IUP at 37w6d       S<D       NST reactive         Patient Active Problem List    Diagnosis Date Noted    Supervision of other high risk pregnancies, third trimester 2023    History of chronic hypertension 10/18/2023    History of anxiety and depression in this pregnancy: now improved 10/18/2023    Hx of gestational diabetes in prior pregnancy, currently pregnant 2023    Rubella non-immune status, antepartum 2023    H/O gastric sleeve 05/15/2023    Rh negative status during pregnancy in first trimester 2016            TDAP: yes       FLU: yes        BTL: no       : n/a       C/S Consent: n/a       IOL or C/S scheduled: no       LAST PAP: 23 ASCUS HPV neg, f/u in 3 years           P:        Discussed normal s/sx pregnancy appropriate for gestational age and labor warning signs vs general discomforts       Reviewed fetal movements appropriate for EGA and kick counts       Reinforced adequate hydration and nutrition       Reviewed  GBS negative        SVE deferred/       NST completed today d/t decreased fetal movement. Discussed reactive NST and FKC for remainder of pregnancy, especially in the case of decreased FM. Discussed when to call with dec FM, labor, or LOF.        Anticipatory guidance provided regarding due date. Will sched IOL NV in case of post-dates.        RTC in 1 weeks for routine prenatal care      Veena Fonseca C.N.M.

## 2023-12-12 ENCOUNTER — HOSPITAL ENCOUNTER (OUTPATIENT)
Dept: RADIOLOGY | Facility: MEDICAL CENTER | Age: 28
End: 2023-12-12
Attending: NURSE PRACTITIONER
Payer: MEDICAID

## 2023-12-12 DIAGNOSIS — N63.21 MASS OF UPPER OUTER QUADRANT OF LEFT BREAST: ICD-10-CM

## 2023-12-12 PROBLEM — N63.42 SUBAREOLAR MASS OF LEFT BREAST: Status: ACTIVE | Noted: 2023-12-12

## 2023-12-12 PROCEDURE — 76642 ULTRASOUND BREAST LIMITED: CPT | Mod: LT

## 2023-12-14 ENCOUNTER — APPOINTMENT (OUTPATIENT)
Dept: RADIOLOGY | Facility: IMAGING CENTER | Age: 28
End: 2023-12-14
Attending: OBSTETRICS & GYNECOLOGY
Payer: MEDICAID

## 2023-12-14 DIAGNOSIS — O26.843 UTERINE SIZE-DATE DISCREPANCY IN THIRD TRIMESTER: ICD-10-CM

## 2023-12-14 PROCEDURE — 76816 OB US FOLLOW-UP PER FETUS: CPT | Mod: TC | Performed by: OBSTETRICS & GYNECOLOGY

## 2023-12-15 ENCOUNTER — ROUTINE PRENATAL (OUTPATIENT)
Dept: OBGYN | Facility: CLINIC | Age: 28
End: 2023-12-15
Payer: MEDICAID

## 2023-12-15 VITALS — WEIGHT: 194 LBS | SYSTOLIC BLOOD PRESSURE: 110 MMHG | DIASTOLIC BLOOD PRESSURE: 70 MMHG | BODY MASS INDEX: 31.31 KG/M2

## 2023-12-15 DIAGNOSIS — O09.893 SUPERVISION OF OTHER HIGH RISK PREGNANCIES, THIRD TRIMESTER: ICD-10-CM

## 2023-12-15 DIAGNOSIS — N63.42 SUBAREOLAR MASS OF LEFT BREAST: ICD-10-CM

## 2023-12-15 PROCEDURE — 3074F SYST BP LT 130 MM HG: CPT

## 2023-12-15 PROCEDURE — 3078F DIAST BP <80 MM HG: CPT

## 2023-12-15 PROCEDURE — 0502F SUBSEQUENT PRENATAL CARE: CPT

## 2023-12-15 ASSESSMENT — FIBROSIS 4 INDEX: FIB4 SCORE: 0.27

## 2023-12-15 NOTE — PROGRESS NOTES
S: Pt is a 28 y.o.  at 39w0d gestation here today for routine prenatal care. Reports feeling ready for labor, denies concerns today and would like a cervical exam.     Pt reports fetal movement; denies cramping, vaginal bleeding, and leaking of fluid. Denies dysuria. Denies headache, visual changes, or epigastric pain.       O: /70   Wt 194 lb    *see prenatal flowsheet*     Labs:        PNL:   ABO: O neg  Antibodies: neg  H&H: 10.3, 32.1  Plt: 354  HIV: NR  Hep B: NR  Hep C: NR  Rubella: non-immune  RPR: NR  GC/GT: neg  Ucx: neg       GCT: 96        AFP: NIPT low-risk       GBS: negative       Ultrasound:  EFW 51%ile, FITZ 14.7cm, HC 29.9%ile, AC 29.5%ile    SVE: 3    A: IUP at 39w0d       S<D, EFW 51%ile          Patient Active Problem List    Diagnosis Date Noted    Subareolar mass of left breast 2023    Supervision of other high risk pregnancies, third trimester 2023    History of chronic hypertension 10/18/2023    History of anxiety and depression in this pregnancy: now improved 10/18/2023    Hx of gestational diabetes in prior pregnancy, currently pregnant 2023    Rubella non-immune status, antepartum 2023    H/O gastric sleeve 05/15/2023    Rh negative status during pregnancy in first trimester 2016            TDAP: yes       FLU: yes        BTL: no       : no       C/S Consent: no       IOL or C/S scheduled: yes - requested -    P:        Discussed normal s/sx pregnancy appropriate for gestational age and labor warning signs vs general discomforts       Reviewed fetal movements appropriate for EGA and kick counts       Reinforced adequate hydration and nutrition       Reviewed IOL scheduled for -. Pt v/u.       Disc labor precautions and reasons to reports to L&D       RTC in 1 week for routine prenatal care      Veena Fonseca C.N.M.

## 2023-12-15 NOTE — PROGRESS NOTES
OB follow up   + fetal movement.  No VB, LOF or UC's.  Phone # [phone] (home) [phone] (work)  Preferred pharmacy confirmed.  GBS(-)   US- yesterday, would like to know results as well as US in breast.   Labs in media

## 2023-12-18 ENCOUNTER — ROUTINE PRENATAL (OUTPATIENT)
Dept: OBGYN | Facility: CLINIC | Age: 28
End: 2023-12-18
Payer: MEDICAID

## 2023-12-18 VITALS — DIASTOLIC BLOOD PRESSURE: 72 MMHG | SYSTOLIC BLOOD PRESSURE: 100 MMHG | BODY MASS INDEX: 31.96 KG/M2 | WEIGHT: 198 LBS

## 2023-12-18 DIAGNOSIS — O09.893 SUPERVISION OF OTHER HIGH RISK PREGNANCIES, THIRD TRIMESTER: ICD-10-CM

## 2023-12-18 PROCEDURE — 3078F DIAST BP <80 MM HG: CPT

## 2023-12-18 PROCEDURE — 0502F SUBSEQUENT PRENATAL CARE: CPT

## 2023-12-18 PROCEDURE — 3074F SYST BP LT 130 MM HG: CPT

## 2023-12-18 ASSESSMENT — FIBROSIS 4 INDEX: FIB4 SCORE: 0.27

## 2023-12-18 NOTE — PROGRESS NOTES
S: Pt is a 28 y.o.  at 39w3d gestation here today for routine prenatal care. Reports no signs of labor. Would like her cervix checked and sweep performed today.     Pt reports fetal movement; denies cramping, vaginal bleeding, and leaking of fluid. Denies dysuria. Denies headache, visual changes, or epigastric pain.       O: There were no vitals taken for this visit.   *see prenatal flowsheet*     Labs:        PNL:   ABO: O neg  Antibodies: neg  H&H: 10.3, 32.1  Plt: 354  HIV: NR  Hep B: NR  Hep C: NR  Rubella: non-immune  RPR: NR  GC/GT: neg  Ucx: neg       GCT: 96            AFP: NIPT low-risk       GBS: negative       Ultrasound:  EFW 51%ile, FITZ 14.7cm, HC 29.9%ile, AC 29.5%ile    SVE: 2-330/H, soft     A: IUP at 39w3d       S<D; EFW 51%ile on last US          Patient Active Problem List    Diagnosis Date Noted    Subareolar mass of left breast 2023    Supervision of other high risk pregnancies, third trimester 2023    History of chronic hypertension 10/18/2023    History of anxiety and depression in this pregnancy: now improved 10/18/2023    Hx of gestational diabetes in prior pregnancy, currently pregnant 2023    Rubella non-immune status, antepartum 2023    H/O gastric sleeve 05/15/2023    Rh negative status during pregnancy in first trimester 2016            TDAP: yes       FLU: yes        BTL: no       : no       C/S Consent: no       IOL or C/S scheduled: yes - 12/15 at 0900           P:        Discussed normal s/sx pregnancy appropriate for gestational age and labor warning signs vs general discomforts. Reinforced adequate hydration and nutrition. Reviewed fetal movements appropriate for EGA and kick counts       Reviewed growth f/u US report, EFW 51%, FITZ 14.7cm       Disc labor precautions including 5-1-1 rule and when to report to L&D.       Disc IOL on 12/15, information sheet given.       SVE and sweep performed today. Vertex position confirmed by  BSUS. Disc baby high in pelvic, recommended curb walking to help baby lower in pelvis.        Disc pain management in labor--desires IV pain medication in place of epidural to allow or movement in labor.     Veena Fonseca C.N.M.

## 2023-12-18 NOTE — PROGRESS NOTES
OB follow up   + fetal movement.  No VB, LOF or UC's.  Phone # [phone] (home) [phone] (work)  Preferred pharmacy confirmed.    IOL- 12/25/23, instructions given, would like to go over US results

## 2023-12-21 ENCOUNTER — HOSPITAL ENCOUNTER (INPATIENT)
Facility: MEDICAL CENTER | Age: 28
LOS: 2 days | End: 2023-12-23
Attending: OBSTETRICS & GYNECOLOGY | Admitting: OBSTETRICS & GYNECOLOGY
Payer: MEDICAID

## 2023-12-21 ENCOUNTER — HOSPITAL ENCOUNTER (EMERGENCY)
Facility: MEDICAL CENTER | Age: 28
End: 2023-12-21
Attending: OBSTETRICS & GYNECOLOGY | Admitting: OBSTETRICS & GYNECOLOGY
Payer: MEDICAID

## 2023-12-21 ENCOUNTER — ANESTHESIA EVENT (OUTPATIENT)
Dept: ANESTHESIOLOGY | Facility: MEDICAL CENTER | Age: 28
End: 2023-12-21
Payer: MEDICAID

## 2023-12-21 ENCOUNTER — ANESTHESIA (OUTPATIENT)
Dept: ANESTHESIOLOGY | Facility: MEDICAL CENTER | Age: 28
End: 2023-12-21
Payer: MEDICAID

## 2023-12-21 VITALS
OXYGEN SATURATION: 94 % | HEIGHT: 68 IN | SYSTOLIC BLOOD PRESSURE: 114 MMHG | WEIGHT: 198 LBS | HEART RATE: 83 BPM | BODY MASS INDEX: 30.01 KG/M2 | DIASTOLIC BLOOD PRESSURE: 60 MMHG | TEMPERATURE: 97.5 F | RESPIRATION RATE: 16 BRPM

## 2023-12-21 LAB
BASOPHILS # BLD AUTO: 0.5 % (ref 0–1.8)
BASOPHILS # BLD: 0.05 K/UL (ref 0–0.12)
EOSINOPHIL # BLD AUTO: 0.1 K/UL (ref 0–0.51)
EOSINOPHIL NFR BLD: 1.1 % (ref 0–6.9)
ERYTHROCYTE [DISTWIDTH] IN BLOOD BY AUTOMATED COUNT: 39.7 FL (ref 35.9–50)
HCT VFR BLD AUTO: 33.1 % (ref 37–47)
HGB BLD-MCNC: 10.8 G/DL (ref 12–16)
HOLDING TUBE BB 8507: NORMAL
IMM GRANULOCYTES # BLD AUTO: 0.06 K/UL (ref 0–0.11)
IMM GRANULOCYTES NFR BLD AUTO: 0.6 % (ref 0–0.9)
LYMPHOCYTES # BLD AUTO: 2.86 K/UL (ref 1–4.8)
LYMPHOCYTES NFR BLD: 30.6 % (ref 22–41)
MCH RBC QN AUTO: 27.1 PG (ref 27–33)
MCHC RBC AUTO-ENTMCNC: 32.6 G/DL (ref 32.2–35.5)
MCV RBC AUTO: 83.2 FL (ref 81.4–97.8)
MONOCYTES # BLD AUTO: 0.67 K/UL (ref 0–0.85)
MONOCYTES NFR BLD AUTO: 7.2 % (ref 0–13.4)
NEUTROPHILS # BLD AUTO: 5.62 K/UL (ref 1.82–7.42)
NEUTROPHILS NFR BLD: 60 % (ref 44–72)
NRBC # BLD AUTO: 0 K/UL
NRBC BLD-RTO: 0 /100 WBC (ref 0–0.2)
PLATELET # BLD AUTO: 381 K/UL (ref 164–446)
PMV BLD AUTO: 9.4 FL (ref 9–12.9)
RBC # BLD AUTO: 3.98 M/UL (ref 4.2–5.4)
T PALLIDUM AB SER QL IA: NORMAL
WBC # BLD AUTO: 9.4 K/UL (ref 4.8–10.8)

## 2023-12-21 PROCEDURE — 85025 COMPLETE CBC W/AUTO DIFF WBC: CPT

## 2023-12-21 PROCEDURE — 86780 TREPONEMA PALLIDUM: CPT

## 2023-12-21 PROCEDURE — 59409 OBSTETRICAL CARE: CPT

## 2023-12-21 PROCEDURE — 700111 HCHG RX REV CODE 636 W/ 250 OVERRIDE (IP): Mod: JZ

## 2023-12-21 PROCEDURE — 36415 COLL VENOUS BLD VENIPUNCTURE: CPT

## 2023-12-21 PROCEDURE — 59025 FETAL NON-STRESS TEST: CPT

## 2023-12-21 PROCEDURE — 99999 PR NO CHARGE: CPT | Performed by: OBSTETRICS & GYNECOLOGY

## 2023-12-21 PROCEDURE — 303615 HCHG EPIDURAL/SPINAL ANESTHESIA FOR LABOR

## 2023-12-21 PROCEDURE — 59400 OBSTETRICAL CARE: CPT | Performed by: ADVANCED PRACTICE MIDWIFE

## 2023-12-21 PROCEDURE — 700111 HCHG RX REV CODE 636 W/ 250 OVERRIDE (IP): Performed by: ANESTHESIOLOGY

## 2023-12-21 PROCEDURE — 99282 EMERGENCY DEPT VISIT SF MDM: CPT

## 2023-12-21 PROCEDURE — 770002 HCHG ROOM/CARE - OB PRIVATE (112)

## 2023-12-21 PROCEDURE — 0HQ9XZZ REPAIR PERINEUM SKIN, EXTERNAL APPROACH: ICD-10-PCS | Performed by: OBSTETRICS & GYNECOLOGY

## 2023-12-21 PROCEDURE — 700111 HCHG RX REV CODE 636 W/ 250 OVERRIDE (IP): Mod: JZ | Performed by: OBSTETRICS & GYNECOLOGY

## 2023-12-21 PROCEDURE — 700105 HCHG RX REV CODE 258: Performed by: OBSTETRICS & GYNECOLOGY

## 2023-12-21 PROCEDURE — 304965 HCHG RECOVERY SERVICES

## 2023-12-21 RX ORDER — BUPIVACAINE HYDROCHLORIDE 2.5 MG/ML
INJECTION, SOLUTION EPIDURAL; INFILTRATION; INTRACAUDAL PRN
Status: DISCONTINUED | OUTPATIENT
Start: 2023-12-21 | End: 2023-12-21 | Stop reason: SURG

## 2023-12-21 RX ORDER — SODIUM CHLORIDE, SODIUM LACTATE, POTASSIUM CHLORIDE, AND CALCIUM CHLORIDE .6; .31; .03; .02 G/100ML; G/100ML; G/100ML; G/100ML
1000 INJECTION, SOLUTION INTRAVENOUS
Status: DISCONTINUED | OUTPATIENT
Start: 2023-12-21 | End: 2023-12-22 | Stop reason: HOSPADM

## 2023-12-21 RX ORDER — ROPIVACAINE HYDROCHLORIDE 2 MG/ML
INJECTION, SOLUTION EPIDURAL; INFILTRATION; PERINEURAL CONTINUOUS
Status: DISCONTINUED | OUTPATIENT
Start: 2023-12-21 | End: 2023-12-23 | Stop reason: HOSPADM

## 2023-12-21 RX ORDER — TERBUTALINE SULFATE 1 MG/ML
0.25 INJECTION, SOLUTION SUBCUTANEOUS
Status: DISCONTINUED | OUTPATIENT
Start: 2023-12-21 | End: 2023-12-22 | Stop reason: HOSPADM

## 2023-12-21 RX ORDER — OXYTOCIN 10 [USP'U]/ML
10 INJECTION, SOLUTION INTRAMUSCULAR; INTRAVENOUS
Status: DISCONTINUED | OUTPATIENT
Start: 2023-12-21 | End: 2023-12-22 | Stop reason: HOSPADM

## 2023-12-21 RX ORDER — ROPIVACAINE HYDROCHLORIDE 2 MG/ML
INJECTION, SOLUTION EPIDURAL; INFILTRATION; PERINEURAL
Status: COMPLETED
Start: 2023-12-21 | End: 2023-12-21

## 2023-12-21 RX ORDER — SODIUM CHLORIDE, SODIUM LACTATE, POTASSIUM CHLORIDE, AND CALCIUM CHLORIDE .6; .31; .03; .02 G/100ML; G/100ML; G/100ML; G/100ML
250 INJECTION, SOLUTION INTRAVENOUS PRN
Status: DISCONTINUED | OUTPATIENT
Start: 2023-12-21 | End: 2023-12-22 | Stop reason: HOSPADM

## 2023-12-21 RX ORDER — SODIUM CHLORIDE, SODIUM LACTATE, POTASSIUM CHLORIDE, CALCIUM CHLORIDE 600; 310; 30; 20 MG/100ML; MG/100ML; MG/100ML; MG/100ML
INJECTION, SOLUTION INTRAVENOUS CONTINUOUS
Status: DISCONTINUED | OUTPATIENT
Start: 2023-12-21 | End: 2023-12-23 | Stop reason: HOSPADM

## 2023-12-21 RX ORDER — LIDOCAINE HYDROCHLORIDE 10 MG/ML
20 INJECTION, SOLUTION INFILTRATION; PERINEURAL
Status: COMPLETED | OUTPATIENT
Start: 2023-12-21 | End: 2023-12-21

## 2023-12-21 RX ORDER — EPHEDRINE SULFATE 50 MG/ML
5 INJECTION, SOLUTION INTRAVENOUS
Status: DISCONTINUED | OUTPATIENT
Start: 2023-12-21 | End: 2023-12-22 | Stop reason: HOSPADM

## 2023-12-21 RX ORDER — IBUPROFEN 800 MG/1
800 TABLET ORAL
Status: COMPLETED | OUTPATIENT
Start: 2023-12-21 | End: 2023-12-22

## 2023-12-21 RX ORDER — ACETAMINOPHEN 500 MG
1000 TABLET ORAL
Status: DISCONTINUED | OUTPATIENT
Start: 2023-12-21 | End: 2023-12-22 | Stop reason: HOSPADM

## 2023-12-21 RX ADMIN — ROPIVACAINE HYDROCHLORIDE: 2 INJECTION, SOLUTION EPIDURAL; INFILTRATION; PERINEURAL at 21:26

## 2023-12-21 RX ADMIN — OXYTOCIN 20 UNITS: 10 INJECTION, SOLUTION INTRAMUSCULAR; INTRAVENOUS at 22:23

## 2023-12-21 RX ADMIN — FENTANYL CITRATE 100 MCG: 50 INJECTION, SOLUTION INTRAMUSCULAR; INTRAVENOUS at 19:02

## 2023-12-21 RX ADMIN — OXYTOCIN 125 ML/HR: 10 INJECTION, SOLUTION INTRAMUSCULAR; INTRAVENOUS at 23:28

## 2023-12-21 RX ADMIN — FENTANYL CITRATE 100 MCG: 50 INJECTION, SOLUTION INTRAMUSCULAR; INTRAVENOUS at 20:11

## 2023-12-21 RX ADMIN — ROPIVACAINE HYDROCHLORIDE: 2 INJECTION, SOLUTION EPIDURAL; INFILTRATION at 21:26

## 2023-12-21 RX ADMIN — BUPIVACAINE HYDROCHLORIDE 5 ML: 2.5 INJECTION, SOLUTION EPIDURAL; INFILTRATION; INTRACAUDAL at 21:26

## 2023-12-21 ASSESSMENT — FIBROSIS 4 INDEX
FIB4 SCORE: 0.27

## 2023-12-21 ASSESSMENT — PAIN SCALES - GENERAL: PAIN_LEVEL: 2

## 2023-12-21 ASSESSMENT — PATIENT HEALTH QUESTIONNAIRE - PHQ9
2. FEELING DOWN, DEPRESSED, IRRITABLE, OR HOPELESS: NOT AT ALL
SUM OF ALL RESPONSES TO PHQ9 QUESTIONS 1 AND 2: 0

## 2023-12-21 ASSESSMENT — LIFESTYLE VARIABLES
TOTAL SCORE: 0
HAVE PEOPLE ANNOYED YOU BY CRITICIZING YOUR DRINKING: NO
ALCOHOL_USE: NO
TOTAL SCORE: 0
HAVE YOU EVER FELT YOU SHOULD CUT DOWN ON YOUR DRINKING: NO
EVER HAD A DRINK FIRST THING IN THE MORNING TO STEADY YOUR NERVES TO GET RID OF A HANGOVER: NO
TOTAL SCORE: 0
EVER FELT BAD OR GUILTY ABOUT YOUR DRINKING: NO
CONSUMPTION TOTAL: INCOMPLETE

## 2023-12-21 NOTE — PROGRESS NOTES
1151: Pt is a  at 39.6 weeks today, pt presents to L&D with c/o painful Uc's every 10 minutes for past 2 days. Pt reports +FM, denies VB/LOF. EFM and TOCO applied, VS taken, pt comfortable in bed at this time.    1200: This RN and Dr Santoyo at bedside, SVE 3/60/-2. POC discussed, will re-check pt cervix in 1 hour.    1310: SVE /-2, this RN updated Dr Santoyo on pt status/FHT/SVE. POC discussed, will re-check pt cervix in 1 hour.    1415: SVE unchanged from last exam, this RN updated Dr Santoyo on pt status/SVE/FHT. Discharge orders received, see orders for details.     1430:  Discharge instructions given including term labor precautions, UC's, ROM, VB, abn FM, when to return to L&D, f/u with [facility], pelvic rest and modified bedrest. Questions answered at length. Pt verbalized understanding and agreement with POC and discharge. Discharge instructions signed.

## 2023-12-21 NOTE — ED PROVIDER NOTES
"LABOR AND DELIVERY TRIAGE NOTE    PATIENT ID:  NAME:  Shauna Sloan  MRN:               [id number]  YOB: 1995     28 y.o. female  at 39w6d by 6w4d US.    Subjective: Pt reports she is experiencing contractions every 10 minutes starting this morning.     Pregnancy complicated by history of chronic hypertension, previous diabetes now resolved, and gastric sleeve.    positive for contractions  negative pain   negative for LOF  negative for vaginal bleeding  positive for fetal movement    PNL:  Blood Type O-, Rubella non-immune, HIV neg, TrepAb neg, HBsAg NR, GC/CT neg/neg  1 HR GTT: 96  GBS -      ROS: Patient denies any fever chills, nausea, vomiting, headache, chest pain, shortness of breath, or dysuria or unusual swelling of hands or feet.     PMHx:   Past Medical History:   Diagnosis Date    Sleep apnea     +CPAP        OB History    Para Term  AB Living   2 1 1     1   SAB IAB Ectopic Molar Multiple Live Births             1      # Outcome Date GA Lbr Keanu/2nd Weight Sex Delivery Anes PTL Lv   2 Current            1 Term 10/10/16 40w4d  3.629 kg (8 lb) M Vag-Spont  N NEREIDA       GYN: denies STIs, no cervical procedures    PSHx:  No past surgical history on file.    Social Hx:  Social History     Tobacco Use    Smoking status: Former     Current packs/day: 0.00     Types: Cigarettes     Quit date:      Years since quitting: 3.9    Smokeless tobacco: Never   Vaping Use    Vaping Use: Former    Quit date: 2020   Substance Use Topics    Alcohol use: Not Currently     Comment: \"one beer a month or so\".    Drug use: Not Currently     Types: Inhaled     Comment: marijuana- quit around 2022         Medications:  No current facility-administered medications on file prior to encounter.     Current Outpatient Medications on File Prior to Encounter   Medication Sig Dispense Refill    PRENATAL VIT-DOCUSATE-IRON-FA PO Take  by mouth.         Allergies:  NKA      Objective: "    There were no vitals filed for this visit.  No data recorded.    General: No acute distress, resting comfortably in bed.  HEENT: normocephalic, nontraumatic, PERRLA, EOMI  Cardiovascular: Heart RRR with no murmurs, rubs or gallops. Distal Pulses 2+  Respiratory: symmetric chest expansion, lungs CTAB, with no wheezes, rales, rhonci  Abdomen: gravid, nontender  Musculoskeletal: JASSO spontaneously  Neuro: non focal with no numbness, tingling or changes in sensation    Cervix:  3cm/60%/-2  [place]: Uterine Contractions Q12 minutes.   FHRM: Baseline 140,  moderate variability, + accels, no decels      Assessment: 28 y.o. female  at 39w6d presents with contractions every 10 minutes. Cervical exam progressed to 4/70/-2 but did not progress again after an hour and contractions remained 10 minutes apart.    Plan:   - Patient is cleared to return home with family. Encouraged to see MD/DO for increased painful uterine contractions @ 3-5, vaginal bleeding, loss of fluid, or other serious symptoms.      Discussed case with Dr. Diaz, [facility] Attending. Case was discussed and attending agreed with plan prior to discharge of patient.      Robina Santoyo M.D.

## 2023-12-22 LAB
ERYTHROCYTE [DISTWIDTH] IN BLOOD BY AUTOMATED COUNT: 39.5 FL (ref 35.9–50)
HCT VFR BLD AUTO: 27.8 % (ref 37–47)
HGB BLD-MCNC: 9.1 G/DL (ref 12–16)
IMMUNE ROSETTING TEST 8505FMH: NORMAL
MCH RBC QN AUTO: 27.2 PG (ref 27–33)
MCHC RBC AUTO-ENTMCNC: 32.7 G/DL (ref 32.2–35.5)
MCV RBC AUTO: 83 FL (ref 81.4–97.8)
NUMBER OF RH DOSES IND 8505RD: 1
PLATELET # BLD AUTO: 317 K/UL (ref 164–446)
PMV BLD AUTO: 9.5 FL (ref 9–12.9)
RBC # BLD AUTO: 3.35 M/UL (ref 4.2–5.4)
WBC # BLD AUTO: 15.2 K/UL (ref 4.8–10.8)

## 2023-12-22 PROCEDURE — A9270 NON-COVERED ITEM OR SERVICE: HCPCS | Performed by: OBSTETRICS & GYNECOLOGY

## 2023-12-22 PROCEDURE — A9270 NON-COVERED ITEM OR SERVICE: HCPCS | Performed by: ADVANCED PRACTICE MIDWIFE

## 2023-12-22 PROCEDURE — 85461 HEMOGLOBIN FETAL: CPT

## 2023-12-22 PROCEDURE — 700102 HCHG RX REV CODE 250 W/ 637 OVERRIDE(OP): Performed by: OBSTETRICS & GYNECOLOGY

## 2023-12-22 PROCEDURE — 85027 COMPLETE CBC AUTOMATED: CPT

## 2023-12-22 PROCEDURE — 303615 HCHG EPIDURAL/SPINAL ANESTHESIA FOR LABOR

## 2023-12-22 PROCEDURE — 36415 COLL VENOUS BLD VENIPUNCTURE: CPT

## 2023-12-22 PROCEDURE — 700102 HCHG RX REV CODE 250 W/ 637 OVERRIDE(OP): Performed by: ADVANCED PRACTICE MIDWIFE

## 2023-12-22 PROCEDURE — 770002 HCHG ROOM/CARE - OB PRIVATE (112)

## 2023-12-22 RX ORDER — ACETAMINOPHEN 500 MG
1000 TABLET ORAL EVERY 6 HOURS PRN
Status: DISCONTINUED | OUTPATIENT
Start: 2023-12-22 | End: 2023-12-23 | Stop reason: HOSPADM

## 2023-12-22 RX ORDER — SODIUM CHLORIDE, SODIUM LACTATE, POTASSIUM CHLORIDE, CALCIUM CHLORIDE 600; 310; 30; 20 MG/100ML; MG/100ML; MG/100ML; MG/100ML
INJECTION, SOLUTION INTRAVENOUS PRN
Status: DISCONTINUED | OUTPATIENT
Start: 2023-12-22 | End: 2023-12-23 | Stop reason: HOSPADM

## 2023-12-22 RX ORDER — FERROUS SULFATE 325(65) MG
325 TABLET ORAL
Status: DISCONTINUED | OUTPATIENT
Start: 2023-12-22 | End: 2023-12-23 | Stop reason: HOSPADM

## 2023-12-22 RX ORDER — DOCUSATE SODIUM 100 MG/1
100 CAPSULE, LIQUID FILLED ORAL 2 TIMES DAILY PRN
Status: DISCONTINUED | OUTPATIENT
Start: 2023-12-22 | End: 2023-12-23 | Stop reason: HOSPADM

## 2023-12-22 RX ORDER — VITAMIN A ACETATE, BETA CAROTENE, ASCORBIC ACID, CHOLECALCIFEROL, .ALPHA.-TOCOPHEROL ACETATE, DL-, THIAMINE MONONITRATE, RIBOFLAVIN, NIACINAMIDE, PYRIDOXINE HYDROCHLORIDE, FOLIC ACID, CYANOCOBALAMIN, CALCIUM CARBONATE, FERROUS FUMARATE, ZINC OXIDE, CUPRIC OXIDE 3080; 12; 120; 400; 1; 1.84; 3; 20; 22; 920; 25; 200; 27; 10; 2 [IU]/1; UG/1; MG/1; [IU]/1; MG/1; MG/1; MG/1; MG/1; MG/1; [IU]/1; MG/1; MG/1; MG/1; MG/1; MG/1
1 TABLET, FILM COATED ORAL EVERY MORNING
Status: DISCONTINUED | OUTPATIENT
Start: 2023-12-22 | End: 2023-12-23 | Stop reason: HOSPADM

## 2023-12-22 RX ORDER — IBUPROFEN 800 MG/1
800 TABLET ORAL EVERY 8 HOURS PRN
Status: DISCONTINUED | OUTPATIENT
Start: 2023-12-22 | End: 2023-12-23 | Stop reason: HOSPADM

## 2023-12-22 RX ORDER — MISOPROSTOL 200 UG/1
600 TABLET ORAL
Status: DISCONTINUED | OUTPATIENT
Start: 2023-12-22 | End: 2023-12-23 | Stop reason: HOSPADM

## 2023-12-22 RX ADMIN — ACETAMINOPHEN 1000 MG: 500 TABLET ORAL at 01:34

## 2023-12-22 RX ADMIN — PRENATAL WITH FERROUS FUM AND FOLIC ACID 1 TABLET: 3080; 920; 120; 400; 22; 1.84; 3; 20; 10; 1; 12; 200; 27; 25; 2 TABLET ORAL at 07:22

## 2023-12-22 RX ADMIN — IBUPROFEN 800 MG: 800 TABLET, FILM COATED ORAL at 07:23

## 2023-12-22 RX ADMIN — ACETAMINOPHEN 1000 MG: 500 TABLET ORAL at 13:48

## 2023-12-22 RX ADMIN — IBUPROFEN 800 MG: 800 TABLET, FILM COATED ORAL at 00:13

## 2023-12-22 RX ADMIN — IBUPROFEN 800 MG: 800 TABLET, FILM COATED ORAL at 19:06

## 2023-12-22 RX ADMIN — ACETAMINOPHEN 1000 MG: 500 TABLET ORAL at 07:22

## 2023-12-22 RX ADMIN — FERROUS SULFATE TAB 325 MG (65 MG ELEMENTAL FE) 325 MG: 325 (65 FE) TAB at 11:05

## 2023-12-22 ASSESSMENT — PAIN DESCRIPTION - PAIN TYPE
TYPE: ACUTE PAIN

## 2023-12-22 NOTE — ANESTHESIA TIME REPORT
Anesthesia Start and Stop Event Times       Date Time Event    12/21/2023 2111 Ready for Procedure     2118 Anesthesia Start     2219 Anesthesia Stop          Responsible Staff  12/21/23      Name Role Begin End    Carlos Alegre D.O. Anesth 2118 2219          Overtime Reason:  no overtime (within assigned shift)    Comments:

## 2023-12-22 NOTE — L&D DELIVERY NOTE
Admit Date:   2023      Pregnancy Complications: none  Medical Induction of Labor: no  GBS: negative  Anesthesia: epidural  Gestational Age at delivery: 39.6    Patient  progressed well but noted to be OP position. Patient attempted to push unsuccessfully at c/-1. Epidural was  to deliver viable female infant in LOP position at 2219 with coached pushing efforts. Infant placed to maternal abdomen where she was dried and stimulated. A spontaneous cry was noted. Apgar 8, 9      Pitocin infused via IV bolus. After delayed cord clamping, cord was doubly clamped and cut by father. Signs of placenta separation noted and gentle cord traction was completed. Intact placenta was delivered spontaneously at 2223 where 3VC was noted. EBL 150ml. Fundus firm with minimal massage. Inspection of vulva and vagina was completed and vaginal laceration noted and repaired with 3-0 vicryl. Periurethral abrasion was also noted but hemostatic and not requiring repair. Routine postpartum care was initiated as mother and infant stable. Infant weight is pending.        Maria Antonia TOPETE CNM/ Dr. Singleton Attending

## 2023-12-22 NOTE — CARE PLAN
The patient is Stable - Low risk of patient condition declining or worsening    Shift Goals  Clinical Goals: lochia WDL  Patient Goals: pain WDL  Family Goals: bond    Progress made toward(s) clinical / shift goals:    Problem: Knowledge Deficit - Postpartum  Goal: Patient will verbalize and demonstrate understanding of self and infant care  Outcome: Progressing     Problem: Altered Physiologic Condition  Goal: Patient physiologically stable as evidenced by normal lochia, palpable uterine involution and vitals within normal limits  Outcome: Progressing       Patient is not progressing towards the following goals:

## 2023-12-22 NOTE — PROGRESS NOTES
: Pt is a  at 39.6 weeks today, pt presents to L&D with c/o painful Uc's every 2 minutes. Pt reports +FM, denies VB/LOF at this time. Pt breathing heavily through contractions at this time. SVE /-2.     : This RN telephoned Dr Diaz, updated on pt status/SVE/FHT. Admit orders received, see orders for details.     : Report given to Kapil NOONAN, POC discussed.

## 2023-12-22 NOTE — PROGRESS NOTES
Obstetrics & Gynecology Post-Delivery Progress Note    Date of Service      28 y.o.  s/p vaginal, spontaneous  Delivery date: 2023    Events  No events    Subjective  Pain: No  Bleeding: lochia minimal  PO's: taking regular diet  Voiding: without difficulty  Ambulating: yes  Passing flatus: No  Feeding: breastfeeding well    Objective  Temp:  [36.4 °C (97.5 °F)-37.2 °C (99 °F)] 37.2 °C (99 °F)  Pulse:  [] 76  Resp:  [18] 18  BP: (100-138)/(53-76) 113/74  SpO2:  [95 %-96 %] 96 %    Physical Exam  General: well and resting  Chest/Breasts: nipples intact   Abdomen: normal bowel sounds, non-distended  Fundus: firm and at umbilicus  Incision: not applicable, (vaginal delivery)  Perineum: deferred  Extremities: symmetric, calves nontender    Recent Labs     23  1830 23  0355   WBC 9.4 15.2*   RBC 3.98* 3.35*   HEMOGLOBIN 10.8* 9.1*   HEMATOCRIT 33.1* 27.8*   MCV 83.2 83.0   MCH 27.1 27.2   RDW 39.7 39.5   PLATELETCT 381 317   MPV 9.4 9.5   NEUTSPOLYS 60.00  --    LYMPHOCYTES 30.60  --    MONOCYTES 7.20  --    EOSINOPHILS 1.10  --    BASOPHILS 0.50  --        Assessment/Plan  Shauna Sloan is a 28 y.o  s/p postpartum day #1  s/p vaginal, spontaneous    1. Post care: meeting all goals  2. Hemodynamics: stable; start ferrous sulfate  3. Pain: controlled  4. Rh-, Rubella Immune  5. Method of Feeding: plans to breastfeed  6. Method of Contraception:  not assessed  7. Disposition: likely home postpartum day 2    VTE prophylaxis: none indicated

## 2023-12-22 NOTE — ANESTHESIA PROCEDURE NOTES
Epidural Block    Date/Time: 12/21/2023 9:22 PM    Performed by: Carlos Alegre D.O.  Authorized by: Carlos Alegre D.O.    Patient Location:  OB  Start Time:  12/21/2023 9:22 PM  End Time:  12/21/2023 9:26 PM  Reason for Block: labor analgesia    patient identified, IV checked, site marked, risks and benefits discussed, surgical consent, monitors and equipment checked and pre-op evaluation    Patient Position:  Sitting  Prep: ChloraPrep, patient draped and sterile technique    Monitoring:  Blood pressure, continuous pulse oximetry and heart rate  Approach:  Midline  Location:  L3-L4  Injection Technique:  MARIBETH air  Skin infiltration:  Lidocaine  Strength:  1%  Dose:  3ml  Needle Type:  Tuohy  Needle Gauge:  17 G  Needle Length:  3.5 in  Loss of resistance::  6  Catheter Size:  19 G  Catheter at Skin Depth:  10  Test Dose Result:  Negative

## 2023-12-22 NOTE — LACTATION NOTE
This note was copied from a baby's chart.  Initial lactation consultation:     utilized during this consult via [employer]: Latisha #[id number]    39w6d infant female delivered via  to a  mother 23 at 2219. Direct karan +.    History: No significant lactation risk factors noted.     Breast feeding history: Mom reports that her first child was fed both breast and bottle, he breast fed for 3 months before he was exclusively bottle fed. She reports that he preferred the bottle.     Feeding plan: exclusively breast feeding.     Mom reports that baby breast fed well after delivery, and she has been sleepy since. She has not been able to achieve a latch. LC offered assistance and mom agrees.     Bilateral breast assessment WNL, nipples everted and intact, no damage noted.    With permission, hand expression demonstrated. Following several minutes of hand expression, a few small drops were placed on the baby's lips from the right breast. Nothing was able to be hand expressed from the left breast at this time.     Baby placed skin to skin in cross cradle position on the left side. LC assisted with proper positioning, breast wedging and asymmetrical latch technique. Baby sleepy at the breast and not cueing to feed. She was placed skin to skin on mom, and she was encouraged to move her into feeding position any time baby begins showing hunger cues.     Attempted to hand express for spoon feeding, but only a few drops were able to be expressed, these were placed on baby's lips.     Mom reports that baby has been spitty. LC did observe mucus spit up, and the bulb syringe was used to gently remove secretions.     Breast feeding education provided: Education provided regarding the milk making process and supply in demand. Frequent skin to skin encouraged. Encouraged MOB to offer the breast to baby any time she is showing hunger cues (cues reviewed). Encouraged MOB not to limit baby's time at the  breast. Anticipatory guidance provided regarding typical  feeding behaviors in the first 24-48hrs, including cluster feeding. Proper positioning and latch technique verbalized. Education provided regarding the importance of achieving a deep latch with each feeding to ensure proper stimulation, milk transfer, and reduce the chance for nipple damage/pain.     Plan: Continue offering the breast to baby on cue, a minimum of 8 times every 24hrs. Skin to skin for each feeding. Hand expression and feed back colostrum (with RN assistance) if baby due to feed, but too sleepy or unable to latch. Do not limit baby's time at the breast. Reach out to RN/LC if experiencing pain with latch or if unable to latch baby independently.

## 2023-12-22 NOTE — CARE PLAN
The patient is Stable - Low risk of patient condition declining or worsening    Shift Goals  Clinical Goals: delivery, pain mangement  Patient Goals: healthy mom  Family Goals: healthy baby    Progress made toward(s) clinical / shift goals:    Problem: Knowledge Deficit - L&D  Goal: Patient and family/caregivers will demonstrate understanding of plan of care, disease process/condition, diagnostic tests and medications  Outcome: Progressing     Problem: Risk for Injury  Goal: Patient and fetus will be free of preventable injury/complications  Outcome: Progressing     Problem: Pain  Goal: Patient's pain will be alleviated or reduced to the patient’s comfort goal  Outcome: Progressing       Patient is not progressing towards the following goals:

## 2023-12-22 NOTE — CARE PLAN
The patient is Stable - Low risk of patient condition declining or worsening    Shift Goals  Clinical Goals: VSS, lochia light, fundus firm  Patient Goals: bond with infant  Family Goals: support    Progress made toward(s) clinical / shift goals:  pt has been up to ambulate, and pain is well controlled       Problem: Pain - Standard  Goal: Alleviation of pain or a reduction in pain to the patient’s comfort goal  Outcome: Progressing     Problem: Early Mobilization - Post Surgery  Goal: Early mobilization post surgery  Outcome: Progressing       Patient is not progressing towards the following goals:

## 2023-12-22 NOTE — PROGRESS NOTES
1825 - Report received from SARAN Larson. POC discussed.    1845 - Pt transferred via wheelchair to S225, monitors on x2. Report given to SARAN Stahl, POC discussed, care transferred.

## 2023-12-22 NOTE — PROGRESS NOTES
Assumed patient care. Pt is resting comfortably with baby in crib. Pt declines pain at this time. Patient assessment completed. Discussed infant bulb suction and emergency call light. POC reviewed with patient all questions answered. Will continue to monitor, call light in reach.

## 2023-12-22 NOTE — PROGRESS NOTES
: Received report from Kapil NOONAN    : Kalie KIMBROUGH at bedside to AROM    ; Kalie CLEMENTSM in room to complete SVE. SVE: /-1. Kalie CLEMENTSM sets up for delivery.     : Pt unable to push effectively. Pt requests epidural before continuing to push.     : Sis SEVILLA called for placement of epidural    : Pt starts pushing    0: Kalie CLEMENTSM called to bedside for delivery    2219:  of viable baby girl    0015: Pt up to bathroom, steady gait, assistance x1    0020: Pt able to void, nat pad and bottle given    0030: Pt in wheelchair, baby in arms, belongings with s/o    0040: Gave report to Bertin NOONAN

## 2023-12-22 NOTE — ANESTHESIA POSTPROCEDURE EVALUATION
Patient: Shauna Sloan    Procedure Summary       Date: 12/21/23 Room / Location:     Anesthesia Start: 2118 Anesthesia Stop: 2219    Procedure: Labor Epidural Diagnosis:     Scheduled Providers:  Responsible Provider: Carlos Alegre D.O.    Anesthesia Type: epidural ASA Status: 2            Final Anesthesia Type: epidural  Last vitals  BP   Blood Pressure: 100/53    Temp   36.4 °C (97.5 °F)    Pulse   82   Resp   18    SpO2   96 %      Anesthesia Post Evaluation    Patient location during evaluation: floor  Patient participation: complete - patient participated  Level of consciousness: awake and alert  Pain score: 2    Airway patency: patent  Anesthetic complications: no  Cardiovascular status: hemodynamically stable  Respiratory status: acceptable  Hydration status: euvolemic    PONV: none          No notable events documented.

## 2023-12-22 NOTE — H&P
"Admission History and Physical      Shauna Sloan is a 28 y.o. female  at 39w6d who presents for abdominal pain.    Subjective:   positive  For CTXS.   positive Feels pain   negative for LOF  negative for vaginal bleeding.   positive for fetal movement    She was seen in OB ED earlier in the day and desired to go home in early labor.     ROS: Pertinent items are noted in HPI.    Past Medical History:   Diagnosis Date    Sleep apnea     +CPAP     No past surgical history on file.  OB History    Para Term  AB Living   2 1 1     1   SAB IAB Ectopic Molar Multiple Live Births             1      # Outcome Date GA Lbr Keanu/2nd Weight Sex Delivery Anes PTL Lv   2 Current            1 Term 10/10/16 40w4d  3.629 kg (8 lb) M Vag-Spont  N NEREIDA     Social History     Socioeconomic History    Marital status:      Spouse name: Not on file    Number of children: Not on file    Years of education: Not on file    Highest education level: Not on file   Occupational History    Not on file   Tobacco Use    Smoking status: Former     Current packs/day: 0.00     Types: Cigarettes     Quit date:      Years since quitting: 3.9    Smokeless tobacco: Never   Vaping Use    Vaping Use: Former    Quit date: 2020   Substance and Sexual Activity    Alcohol use: Not Currently     Comment: \"one beer a month or so\".    Drug use: Not Currently     Types: Inhaled     Comment: marijuana- quit around 2022    Sexual activity: Yes     Partners: Male     Comment: none    Other Topics Concern    Not on file   Social History Narrative    Not on file     Social Determinants of Health     Financial Resource Strain: Not on file   Food Insecurity: Not on file   Transportation Needs: Not on file   Physical Activity: Not on file   Stress: Not on file   Social Connections: Not on file   Intimate Partner Violence: Not on file   Housing Stability: Not on file     Allergies: Patient has no known allergies.    Current " Facility-Administered Medications:     LR infusion, , Intravenous, Continuous, Wil Diaz M.D., Held at 12/21/23 1900    lidocaine (XYLOCAINE) 1%  injection, 20 mL, Subcutaneous, Once PRN, Wil Diaz M.D.    terbutaline (Brethine) injection 0.25 mg, 0.25 mg, Subcutaneous, Once PRN, Wil Diaz M.D.    oxytocin (Pitocin) infusion bolus (for post delivery), 20 Units, Intravenous, Once, Dose not Required at 12/21/23 1900 **FOLLOWED BY** oxytocin (Pitocin) infusion (for post delivery), 125 mL/hr, Intravenous, Continuous, Wil Diaz M.D., Dose not Required at 12/21/23 1855    oxytocin (Pitocin) injection 10 Units, 10 Units, Intramuscular, Once PRN, Wil Diaz M.D.    ibuprofen (Motrin) tablet 800 mg, 800 mg, Oral, Once PRN, Wil Diaz M.D.    acetaminophen (Tylenol) tablet 1,000 mg, 1,000 mg, Oral, Once PRN, Wil Diaz M.D.    fentaNYL (Sublimaze) injection 50 mcg, 50 mcg, Intravenous, Q HOUR PRN **OR** fentaNYL (Sublimaze) injection 100 mcg, 100 mcg, Intravenous, Q HOUR PRN, Wil Diaz M.D., 100 mcg at 12/21/23 1902    [facility]atal care with [facility] starting at 8 weeks with following problems:  Patient Active Problem List    Diagnosis Date Noted    Indication for care in labor or delivery 12/21/2023    Subareolar mass of left breast 12/12/2023    Supervision of other high risk pregnancies, third trimester 11/22/2023    History of chronic hypertension 10/18/2023    History of anxiety and depression in this pregnancy: now improved 10/18/2023    Hx of gestational diabetes in prior pregnancy, currently pregnant 07/27/2023    Rubella non-immune status, antepartum 07/17/2023    H/O gastric sleeve 05/15/2023    Rh negative status during pregnancy in first trimester 07/19/2016       Last US at 37 weeks  12/14/2023 8:38 AM     HISTORY/REASON FOR EXAM:  Evaluate fetal growth     TECHNIQUE/EXAM DESCRIPTION: OB limited ultrasound.     COMPARISON:  Obstetrical  "ultrasounds 8/7 and 10/7/2023     FINDINGS:  Fetal Lie:  Vertex  LMP:  11/8/2022  Clinical BRENNEN by LMP:  12/22/2023     Placenta (Location):  Posterior, left lateral  Placenta Previa: No  Placental Grade: II     Amniotic Fluid Volume:  FITZ = 14.7 cm     Fetal Heart Rate:  131 bpm     No maternal adnexal mass is identified.     Fetal Biometry  BPD    9.4 cm, 38 weeks, 2 days, (66.4%)  HC    33.7 cm, 38 weeks, 4 days, (29.9%)  AC    33.5 cm, 37 weeks, 3 days, (29.5%)  Femur Length    7.8 cm, 39 weeks, 5 days, (76.0%)  Humerus Length    6.2 cm, 35 weeks, 4 days, (9.3%)     EGA by this US:  37 weeks, 6 days  BRENNEN by this US: 12/29/2023  BRENNEN by 1st US:  12/22/2023     Estimated Fetal Weight:  3418 grams  EFW Percentile: 51%     Comments:     IMPRESSION:     Single intrauterine pregnancy of an estimated gestational age of 37 weeks, 6 days with an estimated date of delivery of 12/29/2023.     Complete fetal survey was not performed.        Objective:      /68   Pulse 87   Temp 36.9 °C (98.4 °F) (Temporal)   Resp 18   Ht 1.727 m (5' 8\")   Wt 89.8 kg (198 lb)   SpO2 95%     General:   no acute distress, alert, cooperative   Skin:   normal   HEENT:  PERRLA   Lungs:   CTA bilateral   Heart:   S1, S2 normal, no murmur, click, rub or gallop, regular rate and rhythm, peripheral pulses very brisk, chest is clear without rales or wheezing, no pedal edema, no hepatosplenomegaly   Abdomen:   gravid, NT   EFW:  3600g   Pelvis:  adequate, diagnonal conjugate not met    FHT:  140 BPM   Uterine Size: S=D   Presentations: Cephalic   Cervix:     Dilation: 6-7    Effacement: 80    Station:  -1    Consistency: Soft    Position: Anterior     Lab Review  Lab:   Blood type: O     Recent Results (from the past 5880 hour(s))   CBC WITH DIFFERENTIAL    Collection Time: 05/02/23  1:58 AM   Result Value Ref Range    WBC 9.8 4.8 - 10.8 K/uL    RBC 4.13 (L) 4.20 - 5.40 M/uL    Hemoglobin 12.1 12.0 - 16.0 g/dL    Hematocrit 36.0 (L) 37.0 - " 47.0 %    MCV 87.2 81.4 - 97.8 fL    MCH 29.3 27.0 - 33.0 pg    MCHC 33.6 33.6 - 35.0 g/dL    RDW 38.9 35.9 - 50.0 fL    Platelet Count 340 164 - 446 K/uL    MPV 9.7 9.0 - 12.9 fL    Neutrophils-Polys 53.90 44.00 - 72.00 %    Lymphocytes 37.10 22.00 - 41.00 %    Monocytes 4.40 0.00 - 13.40 %    Eosinophils 3.50 0.00 - 6.90 %    Basophils 0.80 0.00 - 1.80 %    Immature Granulocytes 0.30 0.00 - 0.90 %    Nucleated RBC 0.00 /100 WBC    Neutrophils (Absolute) 5.30 2.00 - 7.15 K/uL    Lymphs (Absolute) 3.64 1.00 - 4.80 K/uL    Monos (Absolute) 0.43 0.00 - 0.85 K/uL    Eos (Absolute) 0.34 0.00 - 0.51 K/uL    Baso (Absolute) 0.08 0.00 - 0.12 K/uL    Immature Granulocytes (abs) 0.03 0.00 - 0.11 K/uL    NRBC (Absolute) 0.00 K/uL   COMP METABOLIC PANEL    Collection Time: 05/02/23  1:58 AM   Result Value Ref Range    Sodium 139 135 - 145 mmol/L    Potassium 3.9 3.6 - 5.5 mmol/L    Chloride 109 96 - 112 mmol/L    Co2 20 20 - 33 mmol/L    Anion Gap 10.0 7.0 - 16.0    Glucose 110 (H) 65 - 99 mg/dL    Bun 10 8 - 22 mg/dL    Creatinine 0.48 (L) 0.50 - 1.40 mg/dL    Calcium 8.8 8.5 - 10.5 mg/dL    AST(SGOT) 12 12 - 45 U/L    ALT(SGPT) 13 2 - 50 U/L    Alkaline Phosphatase 81 30 - 99 U/L    Total Bilirubin 0.2 0.1 - 1.5 mg/dL    Albumin 3.7 3.2 - 4.9 g/dL    Total Protein 6.2 6.0 - 8.2 g/dL    Globulin 2.5 1.9 - 3.5 g/dL    A-G Ratio 1.5 g/dL   LIPASE    Collection Time: 05/02/23  1:58 AM   Result Value Ref Range    Lipase 54 11 - 82 U/L   HCG QUANTITATIVE    Collection Time: 05/02/23  1:58 AM   Result Value Ref Range    Bhcg 19257.0 (H) 0.0 - 5.0 mIU/mL   CORRECTED CALCIUM    Collection Time: 05/02/23  1:58 AM   Result Value Ref Range    Correct Calcium 9.0 8.5 - 10.5 mg/dL   ESTIMATED GFR    Collection Time: 05/02/23  1:58 AM   Result Value Ref Range    GFR (CKD-EPI) 133 >60 mL/min/1.73 m 2   RH TYPE FOR RHOGAM FROM E.D.    Collection Time: 05/02/23  1:58 AM   Result Value Ref Range    Emergency Department Rh Typing NEG     Number  Of Rh Doses Indicated 1    ACTION: RH IMMUNE GLOBULIN    Collection Time: 23  1:58 AM   Result Value Ref Range    Action  Rh Immune Globulin [id number]       issued       1 Syrng    TROPONIN    Collection Time: 23  1:58 AM   Result Value Ref Range    Troponin T <6 6 - 19 ng/L   URINALYSIS,CULTURE IF INDICATED    Collection Time: 23  3:00 AM    Specimen: Urine   Result Value Ref Range    Color Yellow     Character Clear     Specific Gravity 1.022 <1.035    Ph 6.0 5.0 - 8.0    Glucose Negative Negative mg/dL    Ketones Negative Negative mg/dL    Protein Negative Negative mg/dL    Bilirubin Negative Negative    Urobilinogen, Urine 1.0 Negative    Nitrite Negative Negative    Leukocyte Esterase Trace (A) Negative    Occult Blood Negative Negative    Micro Urine Req Microscopic    URINE MICROSCOPIC (W/UA)    Collection Time: 23  3:00 AM   Result Value Ref Range    WBC 0-2 /hpf    RBC 0-2 /hpf    Bacteria Negative None /hpf    Epithelial Cells Few /hpf    Hyaline Cast 0-2 /lpf   EKG    Collection Time: 23  4:51 AM   Result Value Ref Range    Report       [facility] Emergency Dept.    Test Date:  2023  Pt [facility]:    [facility]  MRN:        [id number]                      Room:       [facility]  Gender:     Female                       Technician: [id number]  :        [id number]                   Requested By:TODD CROCKER II  Order #:    [id number]                    Reading MD: Todd Crocker II, MD    Measurements  Intervals                                Axis  Rate:       62                           P:          30  GA:         144                          QRS:        70  QRSD:       85                           T:          36  QT:         404  QTc:        411    Interpretive Statements  Sinus rhythm  Rate 62  Normal intervals  No ST elevation or depression. Normal Twaves.  Impression: Normal sinus rhythm EKG.  Compared to ECG  08/08/2022 09:57:42  T-wave abnormality now present  Sinus bradycardia no longer present  Electronically Signed On 5-2-2023 6:20:03 PDT by Todd leblanc II, MD     POCT Glucose    Collection Time: 07/06/23 11:51 AM   Result Value Ref Range    Glucose - Accu-Ck 86 65 - 99 mg/dL   NIPT Fetal Aneuploidy Screen w/ Del 22q11.2    Collection Time: 07/06/23 12:15 PM   Result Value Ref Range    Fetal Aneuploidy - Final Results Summary See Notes     Fetal Sex Female     Fetal Fraction 9.7 %    Trisomy 21 Low Risk     Trisomy 18 Low Risk     Trisomy 13 Low Risk     Monosomy X Low Risk     Fetal Aneuploidy - Triploidy Low Risk     22q11.2 Deletion Syndrome Low Risk     Gestational Age At Time Of Draw (Weeks) 14     Gestational Age at Draw (days) 0     Maternal Weight (pounds) 177     Number of Fetuses 1     Report Fetal Sex Yes     EER Fetal Aneuploidy Screen 22Q11.2 Del See Note    POCT Glucose    Collection Time: 07/20/23  9:35 AM   Result Value Ref Range    Glucose - Accu-Ck 78 65 - 99 mg/dL   POCT Glucose    Collection Time: 07/27/23  9:09 AM   Result Value Ref Range    Glucose - Accu-Ck 95 65 - 99 mg/dL   FETAL FIBRONECTIN    Collection Time: 10/07/23  4:15 PM   Result Value Ref Range    Fetal Fibronectin Negative    POCT Fetal Nonstress Test    Collection Time: 12/07/23  9:51 AM   Result Value Ref Range    NST Indications      NST Baseline      NST Uterine Activity      NST Acoustic Stimulation      NST Assessment      NST Action Necessary      NST Other Data      NST Return      NST Read By     Hold Blood Bank Specimen (Not Tested)    Collection Time: 12/21/23  6:30 PM   Result Value Ref Range    Holding Tube - Bb DONE    CBC with differential    Collection Time: 12/21/23  6:30 PM   Result Value Ref Range    WBC 9.4 4.8 - 10.8 K/uL    RBC 3.98 (L) 4.20 - 5.40 M/uL    Hemoglobin 10.8 (L) 12.0 - 16.0 g/dL    Hematocrit 33.1 (L) 37.0 - 47.0 %    MCV 83.2 81.4 - 97.8 fL    MCH 27.1 27.0 - 33.0 pg    MCHC 32.6 32.2 -  35.5 g/dL    RDW 39.7 35.9 - 50.0 fL    Platelet Count 381 164 - 446 K/uL    MPV 9.4 9.0 - 12.9 fL    Neutrophils-Polys 60.00 44.00 - 72.00 %    Lymphocytes 30.60 22.00 - 41.00 %    Monocytes 7.20 0.00 - 13.40 %    Eosinophils 1.10 0.00 - 6.90 %    Basophils 0.50 0.00 - 1.80 %    Immature Granulocytes 0.60 0.00 - 0.90 %    Nucleated RBC 0.00 0.00 - 0.20 /100 WBC    Neutrophils (Absolute) 5.62 1.82 - 7.42 K/uL    Lymphs (Absolute) 2.86 1.00 - 4.80 K/uL    Monos (Absolute) 0.67 0.00 - 0.85 K/uL    Eos (Absolute) 0.10 0.00 - 0.51 K/uL    Baso (Absolute) 0.05 0.00 - 0.12 K/uL    Immature Granulocytes (abs) 0.06 0.00 - 0.11 K/uL    NRBC (Absolute) 0.00 K/uL          Assessment:   Shauna Sloan at 39w6d  Labor status: Active phase labor.  Obstetrical history significant for   Patient Active Problem List    Diagnosis Date Noted    Indication for care in labor or delivery 12/21/2023    Subareolar mass of left breast 12/12/2023    Supervision of other high risk pregnancies, third trimester 11/22/2023    History of chronic hypertension 10/18/2023    History of anxiety and depression in this pregnancy: now improved 10/18/2023    Hx of gestational diabetes in prior pregnancy, currently pregnant 07/27/2023    Rubella non-immune status, antepartum 07/17/2023    H/O gastric sleeve 05/15/2023    Rh negative status during pregnancy in first trimester 07/19/2016   .      Plan:     1. Admit to L&D  2. GBS negative   3. Desires nothing for pain relief. Considering epidural but would like to see how far she can progress.  Open to fentanyl.  4. Plan at this time is expectant management . Consider AROM for augmentation if appropriate.       CFkaleb KIMBROUGH/ Dr. Singleton Attending

## 2023-12-22 NOTE — PROGRESS NOTES
0700- report received from NOC RN. POC discussed including feeding intervals, I+O documentation, latch support, lochia changes, ambulation, infant temperature management including STS and layering, weights, VS intervals, and discharge planning. Medications and other comfort measures discussed. Call light in reach.     1400- MOB has chosen to breast feed and supplement with formula. Formula provided per request. Education provided.

## 2023-12-22 NOTE — DISCHARGE PLANNING
Discharge Planning Assessment Post Partum    Reason for Referral: History of anxiety and depression   Address: [place] [address]  Phone: [phone]  Type of Living Situation: stable housing-living with FOB  Mom Diagnosis: Pregnancy-vaginal delivery   Baby Diagnosis: -39.6 weeks  Primary Language: Thai and English     Name of Baby: Dianna Vail (: 23)  Father of the Baby: Jg Lassiter   Involved in baby’s care? Yes  Contact Information: [phone]    Prenatal Care:  Yes-[facility] starting at 8 weeks   Mom's PCP: YOSELYN Mckay   PCP for new baby: Pediatrician list provided     Support System: FOB and family   Coping/Bonding between mother & baby: Yes  Source of Feeding: breast feeding   Supplies for Infant: prepared for infant; denies any needs    Mom's Insurance: [employer]   Baby Covered on Insurance:Yes  Mother Employed/School: [employer]   Other children in the home/names & ages: son-7 years old     Financial Hardship/Income: No   Mom's Mental status: alert and oriented   Services used prior to admit: [employer]     CPS History: No  Psychiatric History: history of anxiety and depression.  Discussed with mother who denies any current symptoms or signs.  Provided post partum depression counseling and support group resources.  Domestic Violence History: No  Drug/ETOH History: No    Resources Provided: pediatrician list, children and family resource list, post partum support and counseling resources, and diaper bank assistance resources   Referrals Made: diaper bank referral provided      Clearance for Discharge: Infant is cleared to discharge home with parents once medically cleared

## 2023-12-22 NOTE — ANESTHESIA PREPROCEDURE EVALUATION
Date: 23  Procedure: Labor Epidural        @ 39w6d, IUP, [place]  JOHN    Relevant Problems   No relevant active problems       Physical Exam    Airway   Mallampati: II  TM distance: >3 FB  Neck ROM: full       Cardiovascular - normal exam  Rhythm: regular  Rate: normal  (-) murmur     Dental - normal exam           Pulmonary - normal exam  Breath sounds clear to auscultation     Abdominal    Neurological - normal exam                   Anesthesia Plan    ASA 2       Plan - epidural   Neuraxial block will be labor analgesia                  Pertinent diagnostic labs and testing reviewed    Informed Consent:    Anesthetic plan and risks discussed with patient.

## 2023-12-23 ENCOUNTER — PHARMACY VISIT (OUTPATIENT)
Dept: PHARMACY | Facility: MEDICAL CENTER | Age: 28
End: 2023-12-23
Payer: COMMERCIAL

## 2023-12-23 VITALS
SYSTOLIC BLOOD PRESSURE: 97 MMHG | HEART RATE: 66 BPM | HEIGHT: 68 IN | OXYGEN SATURATION: 97 % | TEMPERATURE: 97 F | RESPIRATION RATE: 17 BRPM | BODY MASS INDEX: 30.01 KG/M2 | DIASTOLIC BLOOD PRESSURE: 56 MMHG | WEIGHT: 198 LBS

## 2023-12-23 LAB — ACTION RH IMMUNE GLOB 8505RHG: NORMAL

## 2023-12-23 PROCEDURE — 700102 HCHG RX REV CODE 250 W/ 637 OVERRIDE(OP): Performed by: ADVANCED PRACTICE MIDWIFE

## 2023-12-23 PROCEDURE — A9270 NON-COVERED ITEM OR SERVICE: HCPCS | Performed by: ADVANCED PRACTICE MIDWIFE

## 2023-12-23 PROCEDURE — RXMED WILLOW AMBULATORY MEDICATION CHARGE

## 2023-12-23 RX ORDER — ACETAMINOPHEN 500 MG
1000 TABLET ORAL EVERY 6 HOURS PRN
Qty: 30 TABLET | Refills: 0 | Status: SHIPPED | OUTPATIENT
Start: 2023-12-23

## 2023-12-23 RX ORDER — PSEUDOEPHEDRINE HCL 30 MG
100 TABLET ORAL 2 TIMES DAILY PRN
Qty: 60 CAPSULE | Refills: 0 | Status: SHIPPED | OUTPATIENT
Start: 2023-12-23 | End: 2023-12-23

## 2023-12-23 RX ORDER — PSEUDOEPHEDRINE HCL 30 MG
100 TABLET ORAL 2 TIMES DAILY PRN
Qty: 60 CAPSULE | Refills: 0 | Status: SHIPPED | OUTPATIENT
Start: 2023-12-23

## 2023-12-23 RX ORDER — ACETAMINOPHEN 500 MG
1000 TABLET ORAL EVERY 6 HOURS PRN
Qty: 30 TABLET | Refills: 0 | Status: SHIPPED | OUTPATIENT
Start: 2023-12-23 | End: 2023-12-23

## 2023-12-23 RX ORDER — IBUPROFEN 800 MG/1
800 TABLET ORAL EVERY 8 HOURS PRN
Qty: 30 TABLET | Refills: 0 | Status: SHIPPED | OUTPATIENT
Start: 2023-12-23

## 2023-12-23 RX ORDER — IBUPROFEN 800 MG/1
800 TABLET ORAL EVERY 8 HOURS PRN
Qty: 30 TABLET | Refills: 0 | Status: SHIPPED | OUTPATIENT
Start: 2023-12-23 | End: 2023-12-23

## 2023-12-23 RX ADMIN — PRENATAL WITH FERROUS FUM AND FOLIC ACID 1 TABLET: 3080; 920; 120; 400; 22; 1.84; 3; 20; 10; 1; 12; 200; 27; 25; 2 TABLET ORAL at 07:51

## 2023-12-23 RX ADMIN — IBUPROFEN 800 MG: 800 TABLET, FILM COATED ORAL at 07:51

## 2023-12-23 RX ADMIN — FERROUS SULFATE TAB 325 MG (65 MG ELEMENTAL FE) 325 MG: 325 (65 FE) TAB at 07:51

## 2023-12-23 ASSESSMENT — EDINBURGH POSTNATAL DEPRESSION SCALE (EPDS)
THINGS HAVE BEEN GETTING ON TOP OF ME: YES, MOST OF THE TIME I HAVEN'T BEEN ABLE TO COPE AT ALL
I HAVE BEEN ANXIOUS OR WORRIED FOR NO GOOD REASON: YES, SOMETIMES
I HAVE BEEN ABLE TO LAUGH AND SEE THE FUNNY SIDE OF THINGS: AS MUCH AS I ALWAYS COULD
I HAVE FELT SCARED OR PANICKY FOR NO GOOD REASON: NO, NOT MUCH
THE THOUGHT OF HARMING MYSELF HAS OCCURRED TO ME: NEVER
I HAVE LOOKED FORWARD WITH ENJOYMENT TO THINGS: AS MUCH AS I EVER DID
I HAVE BEEN SO UNHAPPY THAT I HAVE HAD DIFFICULTY SLEEPING: YES, SOMETIMES
I HAVE BEEN SO UNHAPPY THAT I HAVE BEEN CRYING: ONLY OCCASIONALLY
I HAVE FELT SAD OR MISERABLE: NOT VERY OFTEN
I HAVE BLAMED MYSELF UNNECESSARILY WHEN THINGS WENT WRONG: YES, SOME OF THE TIME

## 2023-12-23 ASSESSMENT — PAIN DESCRIPTION - PAIN TYPE: TYPE: ACUTE PAIN

## 2023-12-23 NOTE — DISCHARGE INSTRUCTIONS

## 2023-12-23 NOTE — LACTATION NOTE
"Followup visit  MOB is breastfeeding and bottle feeding. She states the baby's doctor was concerned about weight loss so she is now supplementing.  I discussed normal  weight loss with MOB and how the breasts make milk. Encouraged to offer breast when baby shows hunger cues and then follow with small amounts of formula as needed.  MARTINEZ denies pain with suckling and nipples are intact. She states she plans to exclusively breastfeed when her milk \"comes in.\"   "

## 2023-12-23 NOTE — CARE PLAN
The patient is Stable - Low risk of patient condition declining or worsening    Shift Goals  Clinical Goals: VSS, lochia light, fundus firm  Patient Goals: bond with infant  Family Goals: support    Progress made toward(s) clinical / shift goals:  pt VSS stable, lochia is light, fundus is firm      Problem: Knowledge Deficit - Postpartum  Goal: Patient will verbalize and demonstrate understanding of self and infant care  Outcome: Progressing     Problem: Altered Physiologic Condition  Goal: Patient physiologically stable as evidenced by normal lochia, palpable uterine involution and vitals within normal limits  Outcome: Progressing       Patient is not progressing towards the following goals:

## 2023-12-23 NOTE — DISCHARGE SUMMARY
Discharge Summary:     Date of Admission: 2023  Date of Discharge: 23      Admitting diagnosis:    1. Pregnancy @ 39w6d  2. SIUP      Discharge Diagnosis:   1. Status post vaginal, spontaneous.    Past Medical History:   Diagnosis Date    Sleep apnea     +CPAP     OB History    Para Term  AB Living   2 2 2     2   SAB IAB Ectopic Molar Multiple Live Births           0 2      # Outcome Date GA Lbr Keanu/2nd Weight Sex Delivery Anes PTL Lv   2 Term 23 39w6d  3.375 kg (7 lb 7.1 oz) F Vag-Spont EPI N NEREIDA   1 Term 10/10/16 40w4d  3.629 kg (8 lb) M Vag-Spont  N NEREIDA     History reviewed. No pertinent surgical history.  Patient has no known allergies.    Patient Active Problem List   Diagnosis    Rh negative status during pregnancy in first trimester    H/O gastric sleeve    Rubella non-immune status, antepartum    Hx of gestational diabetes in prior pregnancy, currently pregnant    History of chronic hypertension    History of anxiety and depression in this pregnancy: now improved    Supervision of other high risk pregnancies, third trimester    Subareolar mass of left breast    Indication for care in labor or delivery       Hospital Course:   Pt is a 28 y.o. now  who presented for abdominal pain and admitted for labor.    Epidural anesthesia was utilized with good effect on pain. Single female  infant was delivered via  at 39w6d. Apgars 8, 9 at 1 and 5 minutes respectively.  ml.     Postpartum course notable for early ambulation, well managed pain, tolerance of diet, spontaneous voiding, and appropriate feeding of infant. She has remained afebrile and blood pressure has been well controlled. All maternal questions and concerns addressed        Physical Exam:  Temp:  [36.1 °C (97 °F)-36.5 °C (97.7 °F)] 36.1 °C (97 °F)  Pulse:  [66-78] 66  Resp:  [17-18] 17  BP: ()/(56-65) 97/56  SpO2:  [95 %-97 %] 97 %  Physical Exam  General: well and resting  Chest/Breasts: nipples  intact and breasts soft   Abdomen: nontender, soft  Fundus: firm and below umbilicus  Incision: not applicable, (vaginal delivery)  Perineum: deferred  Extremities: symmetric and no edema, calves nontender    Current Facility-Administered Medications   Medication Dose    lactated ringers infusion      PRN oxytocin (PITOCIN) (20 Units/1000 mL) PRN for excessive uterine bleeding - See Admin Instr  125-999 mL/hr    miSOPROStol (Cytotec) tablet 600 mcg  600 mcg    docusate sodium (Colace) capsule 100 mg  100 mg    magnesium hydroxide (Milk Of Magnesia) suspension 30 mL  30 mL    ibuprofen (Motrin) tablet 800 mg  800 mg    acetaminophen (Tylenol) tablet 1,000 mg  1,000 mg    prenatal plus vitamin (Stuartnatal 1+1) 27-1 MG tablet 1 Tablet  1 Tablet    ferrous sulfate tablet 325 mg  325 mg    LR infusion      oxytocin (Pitocin) infusion (for post delivery)  125 mL/hr    ropivacaine 0.2 % (Naropin) injection         Recent Labs     12/21/23  1830 12/22/23  0355   WBC 9.4 15.2*   RBC 3.98* 3.35*   HEMOGLOBIN 10.8* 9.1*   HEMATOCRIT 33.1* 27.8*   MCV 83.2 83.0   MCH 27.1 27.2   MCHC 32.6 32.7   RDW 39.7 39.5   PLATELETCT 381 317   MPV 9.4 9.5         Activity/ Discharge Instructions::   Discharge to home  Pelvic Rest x 6 weeks  No heavy lifting x4 weeks  Call or come to ED for: heavy vaginal bleeding, fever >100.4, severe abdominal pain, severe headache, chest pain, shortness of breath,  N/V, incisional drainage, or other concerns.       Follow up:  [facility] in 5 weeks for vaginal delivery;    Discharge Meds:   Current Outpatient Medications   Medication Sig Dispense Refill    acetaminophen (TYLENOL) 500 MG Tab Take 2 Tablets by mouth every 6 hours as needed for Mild Pain or Moderate Pain. 30 Tablet 0    docusate sodium 100 MG Cap Take 100 mg by mouth 2 times a day as needed (constipation). 60 Capsule 0    ibuprofen (MOTRIN) 800 MG Tab Take 1 Tablet by mouth every 8 hours as needed for Mild Pain or Moderate Pain.  30 Tablet 0       Veena Chase M.D.

## 2023-12-23 NOTE — PROGRESS NOTES
Patient and infant discharged with escort off unit. Infant discharged in car seat, patient walking. Infant placed in car seat by parents and checked by RN. Cuddles removed. Bands verified. All questions answered at this time.

## 2023-12-23 NOTE — PROGRESS NOTES
Assumed patient care. Pt is resting comfortably with baby in crib. Patient assessment completed. Discussed infant bulb suction and emergency call light. POC reviewed with patient all questions answered. Will continue to monitor, call light in reach.

## 2023-12-23 NOTE — CARE PLAN
The patient is Stable - Low risk of patient condition declining or worsening    Shift Goals  Clinical Goals: Pain control, lochia remain WDL, VSS    Progress made toward(s) clinical / shift goals:  Pain controlled with rest, repositioning, and medication as needed. Ambulating, tolerating PO, and voiding without difficulty. VSS.     Patient is not progressing towards the following goals:

## 2023-12-23 NOTE — PROGRESS NOTES
0700- Received report from SARAN Denton. Assumed care. 12 hour chart check, MAR and orders reviewed.      0745- Assessment complete. Fundus firm and palpable, lochia scant to light rubra. Pain management and interventions discussed with pt. SO at bedside. Patient states english communication with RN is fine at this time and I educated patient that I can use ipad  if clarification is needed on POC. POC discussed at this time. All questions and concerns discussed. No further concerns.

## 2023-12-23 NOTE — PROGRESS NOTES
Discharge paperwork for infant and mom discussed at bedside. All questions answered. Follow-up appointments reviewed. MOB aware of NBS #2 and dates to complete it by. Paperwork signed and dated at this time. Patient is declining MMR vaccine at this time and will follow-up with PCP after discharge. All questions answered at this time.

## 2024-01-25 ENCOUNTER — APPOINTMENT (OUTPATIENT)
Dept: OBGYN | Facility: CLINIC | Age: 29
End: 2024-01-25
Payer: MEDICAID

## 2024-02-02 ENCOUNTER — POST PARTUM (OUTPATIENT)
Dept: OBGYN | Facility: CLINIC | Age: 29
End: 2024-02-02
Payer: MEDICAID

## 2024-02-02 VITALS — DIASTOLIC BLOOD PRESSURE: 60 MMHG | BODY MASS INDEX: 25.7 KG/M2 | SYSTOLIC BLOOD PRESSURE: 100 MMHG | WEIGHT: 169 LBS

## 2024-02-02 DIAGNOSIS — N63.42 SUBAREOLAR MASS OF LEFT BREAST: ICD-10-CM

## 2024-02-02 PROCEDURE — 3074F SYST BP LT 130 MM HG: CPT

## 2024-02-02 PROCEDURE — 3078F DIAST BP <80 MM HG: CPT

## 2024-02-02 PROCEDURE — 0503F POSTPARTUM CARE VISIT: CPT

## 2024-02-02 ASSESSMENT — EDINBURGH POSTNATAL DEPRESSION SCALE (EPDS)
I HAVE BEEN SO UNHAPPY THAT I HAVE HAD DIFFICULTY SLEEPING: NOT AT ALL
I HAVE BEEN SO UNHAPPY THAT I HAVE BEEN CRYING: ONLY OCCASIONALLY
I HAVE BEEN ABLE TO LAUGH AND SEE THE FUNNY SIDE OF THINGS: AS MUCH AS I ALWAYS COULD
THE THOUGHT OF HARMING MYSELF HAS OCCURRED TO ME: NEVER
TOTAL SCORE: 6
I HAVE FELT SAD OR MISERABLE: NO, NOT AT ALL
I HAVE LOOKED FORWARD WITH ENJOYMENT TO THINGS: AS MUCH AS I EVER DID
THINGS HAVE BEEN GETTING ON TOP OF ME: NO, MOST OF THE TIME I HAVE COPED QUITE WELL
I HAVE FELT SCARED OR PANICKY FOR NO GOOD REASON: NO, NOT AT ALL
I HAVE BEEN ANXIOUS OR WORRIED FOR NO GOOD REASON: HARDLY EVER
I HAVE BLAMED MYSELF UNNECESSARILY WHEN THINGS WENT WRONG: YES, MOST OF THE TIME

## 2024-02-02 ASSESSMENT — FIBROSIS 4 INDEX: FIB4 SCORE: 0.29

## 2024-02-02 NOTE — PROGRESS NOTES
Subjective:    Shauna Sloan is a 28 y.o. female who presents for her postpartum exam 6 weeks following . Her prenatal course was uncomplicated.  Her delivery was uncomplicated. Her method of feeding infant is breast. She desires permanent sterilization for her birth control method. Reports no sex prior to this appointment. Patient denies crying spells, irritability, or mood swings.     Pap ASCUS with negative HPV on 2023    Eating a regular diet without difficulty.   Bowel movement are normal.      Breast problems: reports breast mass; mammo in pregnancy revealed lesion, rec f/u PP; black raised area on L breast, pt reports that it was leaking blood a few weeks ago though has since resolved; denies fever/chill at any point  Dysuria: denies  Vaginal bleeding: denies  Vaginal itching: denies      Problem List     Patient Active Problem List    Diagnosis Date Noted    Subareolar mass of left breast 2023    Supervision of other high risk pregnancies, third trimester 2023    History of chronic hypertension 10/18/2023    History of anxiety and depression in this pregnancy: now improved 10/18/2023    Hx of gestational diabetes in prior pregnancy, currently pregnant 2023    Rubella non-immune status, antepartum 2023    H/O gastric sleeve 05/15/2023    Rh negative status during pregnancy in first trimester 2016       Objective:      EDPS 6    Lab:No results found for this or any previous visit (from the past 1008 hour(s)).  Vitals:    24 1018   BP: 100/60   Weight: 169 lb     Vitals:    24 1018   BP: 100/60     Objective      Physical Exam:  General: well nourished female in no acute distress; A&O x 3  Lungs: respirations clear and non labored on room air  Heart: regular rate and rhythm; pulses WNL bilaterally in lower extremities  Musculoskeletal: no swelling to bilateral lower extremities; 2cm separation of abdominal muscles  GI: BS x 4; no guarding or tenderness;  uterus non-palpable  Breasts: no erythema or discharge. L breast with skin changes and 0.5cm round/raised black area    Genitourinary: deferred per pts request    Chaperone was offered, declined      Assessment:    1. PP care of lactating woman  2. PP exam WNL  3. Pap ASCUS, neg HPV 5/25/2023  4. Desires permanent sterilization  5. Breast abscess      Plan:    1. Offered breastfeeding support   2. Continue PNV  3. Contraceptive counseling: patient desires BLT, has tried one hormonal option in the past and she did not like it. Dicussed option, patient would like a consult for permanent sterilization. Instructed to make appointment with physician for procedure. Encouraged abstinence or condoms until procedure.   4. Discussed diet, exercise and resumption of sexual activity.  Discussed signs and symptoms of stress incontinence and reviewed pelvic floor exercises.    5. Order for breast US placed for abnormal findings during pregnancy and abnormal lesion on exam; will f/u with results, recommended MRI if lesion is still present  6. Follow up for BTL consult, ZACKARY Fonseca CNM

## 2024-02-02 NOTE — PROGRESS NOTES
Pt here today for postpartum exam.  Delivery type: vaginal 12/21/23  Currently : breast feeding   Desired BCM: would like BTL  LMP: 1/24/24  Last pap: 5/25/23 wnl  Phone # [phone] (home) [phone] (work)  Epds- 6

## 2024-02-02 NOTE — LETTER
February 2, 2024    To Whom It May Concern:         This is confirmation that Shauna Sloan attended her scheduled postpartum appointment with Veena Fonseca C.N.M. on 2/02/24. She has been medically cleared to return to work.         If you have any questions please do not hesitate to call me at the phone number listed below.    Sincerely,          Veena Fonseca C.N.M.  [phone]

## 2025-02-21 ENCOUNTER — APPOINTMENT (OUTPATIENT)
Dept: RADIOLOGY | Facility: MEDICAL CENTER | Age: 30
End: 2025-02-21
Attending: EMERGENCY MEDICINE
Payer: MEDICAID

## 2025-02-21 ENCOUNTER — PHARMACY VISIT (OUTPATIENT)
Dept: PHARMACY | Facility: MEDICAL CENTER | Age: 30
End: 2025-02-21
Payer: MEDICAID

## 2025-02-21 ENCOUNTER — HOSPITAL ENCOUNTER (EMERGENCY)
Facility: MEDICAL CENTER | Age: 30
End: 2025-02-21
Attending: EMERGENCY MEDICINE
Payer: MEDICAID

## 2025-02-21 VITALS
HEART RATE: 60 BPM | DIASTOLIC BLOOD PRESSURE: 53 MMHG | WEIGHT: 178.79 LBS | BODY MASS INDEX: 27.1 KG/M2 | TEMPERATURE: 98.2 F | SYSTOLIC BLOOD PRESSURE: 97 MMHG | HEIGHT: 68 IN | RESPIRATION RATE: 17 BRPM | OXYGEN SATURATION: 98 %

## 2025-02-21 DIAGNOSIS — Z3A.09 9 WEEKS GESTATION OF PREGNANCY: ICD-10-CM

## 2025-02-21 DIAGNOSIS — R10.31 RIGHT LOWER QUADRANT ABDOMINAL PAIN: ICD-10-CM

## 2025-02-21 LAB
ALBUMIN SERPL BCP-MCNC: 3.8 G/DL (ref 3.2–4.9)
ALBUMIN/GLOB SERPL: 1.3 G/DL
ALP SERPL-CCNC: 65 U/L (ref 30–99)
ALT SERPL-CCNC: 8 U/L (ref 2–50)
ANION GAP SERPL CALC-SCNC: 11 MMOL/L (ref 7–16)
APPEARANCE UR: CLEAR
AST SERPL-CCNC: 17 U/L (ref 12–45)
B-HCG SERPL-ACNC: ABNORMAL MIU/ML (ref 0–5)
BASOPHILS # BLD AUTO: 1 % (ref 0–1.8)
BASOPHILS # BLD: 0.07 K/UL (ref 0–0.12)
BILIRUB SERPL-MCNC: 0.3 MG/DL (ref 0.1–1.5)
BILIRUB UR QL STRIP.AUTO: NEGATIVE
BUN SERPL-MCNC: 7 MG/DL (ref 8–22)
CALCIUM ALBUM COR SERPL-MCNC: 8.8 MG/DL (ref 8.5–10.5)
CALCIUM SERPL-MCNC: 8.6 MG/DL (ref 8.5–10.5)
CHLORIDE SERPL-SCNC: 104 MMOL/L (ref 96–112)
CO2 SERPL-SCNC: 20 MMOL/L (ref 20–33)
COLOR UR: YELLOW
CREAT SERPL-MCNC: 0.54 MG/DL (ref 0.5–1.4)
EOSINOPHIL # BLD AUTO: 0.16 K/UL (ref 0–0.51)
EOSINOPHIL NFR BLD: 2.2 % (ref 0–6.9)
ERYTHROCYTE [DISTWIDTH] IN BLOOD BY AUTOMATED COUNT: 40.2 FL (ref 35.9–50)
GFR SERPLBLD CREATININE-BSD FMLA CKD-EPI: 128 ML/MIN/1.73 M 2
GLOBULIN SER CALC-MCNC: 2.9 G/DL (ref 1.9–3.5)
GLUCOSE SERPL-MCNC: 83 MG/DL (ref 65–99)
GLUCOSE UR STRIP.AUTO-MCNC: NEGATIVE MG/DL
HCT VFR BLD AUTO: 34.1 % (ref 37–47)
HGB BLD-MCNC: 11.2 G/DL (ref 12–16)
IMM GRANULOCYTES # BLD AUTO: 0.02 K/UL (ref 0–0.11)
IMM GRANULOCYTES NFR BLD AUTO: 0.3 % (ref 0–0.9)
KETONES UR STRIP.AUTO-MCNC: ABNORMAL MG/DL
LEUKOCYTE ESTERASE UR QL STRIP.AUTO: NEGATIVE
LIPASE SERPL-CCNC: 38 U/L (ref 11–82)
LYMPHOCYTES # BLD AUTO: 2.17 K/UL (ref 1–4.8)
LYMPHOCYTES NFR BLD: 30 % (ref 22–41)
MCH RBC QN AUTO: 27.1 PG (ref 27–33)
MCHC RBC AUTO-ENTMCNC: 32.8 G/DL (ref 32.2–35.5)
MCV RBC AUTO: 82.4 FL (ref 81.4–97.8)
MICRO URNS: ABNORMAL
MONOCYTES # BLD AUTO: 0.43 K/UL (ref 0–0.85)
MONOCYTES NFR BLD AUTO: 5.9 % (ref 0–13.4)
NEUTROPHILS # BLD AUTO: 4.39 K/UL (ref 1.82–7.42)
NEUTROPHILS NFR BLD: 60.6 % (ref 44–72)
NITRITE UR QL STRIP.AUTO: NEGATIVE
NRBC # BLD AUTO: 0 K/UL
NRBC BLD-RTO: 0 /100 WBC (ref 0–0.2)
NUMBER OF RH DOSES IND 8505RD: 1
PH UR STRIP.AUTO: 5.5 [PH] (ref 5–8)
PLATELET # BLD AUTO: 377 K/UL (ref 164–446)
PMV BLD AUTO: 9.1 FL (ref 9–12.9)
POTASSIUM SERPL-SCNC: 3.9 MMOL/L (ref 3.6–5.5)
PROT SERPL-MCNC: 6.7 G/DL (ref 6–8.2)
PROT UR QL STRIP: NEGATIVE MG/DL
RBC # BLD AUTO: 4.14 M/UL (ref 4.2–5.4)
RBC UR QL AUTO: NEGATIVE
RH BLD: NORMAL
SODIUM SERPL-SCNC: 135 MMOL/L (ref 135–145)
SP GR UR STRIP.AUTO: 1.01
UROBILINOGEN UR STRIP.AUTO-MCNC: 0.2 EU/DL
WBC # BLD AUTO: 7.2 K/UL (ref 4.8–10.8)

## 2025-02-21 PROCEDURE — 85025 COMPLETE CBC W/AUTO DIFF WBC: CPT

## 2025-02-21 PROCEDURE — 99285 EMERGENCY DEPT VISIT HI MDM: CPT

## 2025-02-21 PROCEDURE — 700105 HCHG RX REV CODE 258: Mod: UD | Performed by: EMERGENCY MEDICINE

## 2025-02-21 PROCEDURE — 84702 CHORIONIC GONADOTROPIN TEST: CPT

## 2025-02-21 PROCEDURE — 76817 TRANSVAGINAL US OBSTETRIC: CPT

## 2025-02-21 PROCEDURE — 80053 COMPREHEN METABOLIC PANEL: CPT

## 2025-02-21 PROCEDURE — A9270 NON-COVERED ITEM OR SERVICE: HCPCS | Performed by: EMERGENCY MEDICINE

## 2025-02-21 PROCEDURE — 81003 URINALYSIS AUTO W/O SCOPE: CPT

## 2025-02-21 PROCEDURE — 36415 COLL VENOUS BLD VENIPUNCTURE: CPT

## 2025-02-21 PROCEDURE — 76705 ECHO EXAM OF ABDOMEN: CPT

## 2025-02-21 PROCEDURE — 86901 BLOOD TYPING SEROLOGIC RH(D): CPT

## 2025-02-21 PROCEDURE — 700111 HCHG RX REV CODE 636 W/ 250 OVERRIDE (IP): Mod: JZ,UD | Performed by: EMERGENCY MEDICINE

## 2025-02-21 PROCEDURE — RXOTC WILLOW AMBULATORY OTC CHARGE: Performed by: PHARMACIST

## 2025-02-21 PROCEDURE — 700102 HCHG RX REV CODE 250 W/ 637 OVERRIDE(OP): Performed by: EMERGENCY MEDICINE

## 2025-02-21 PROCEDURE — 96374 THER/PROPH/DIAG INJ IV PUSH: CPT

## 2025-02-21 PROCEDURE — 83690 ASSAY OF LIPASE: CPT

## 2025-02-21 RX ORDER — ACETAMINOPHEN 500 MG
1000 TABLET ORAL ONCE
Status: COMPLETED | OUTPATIENT
Start: 2025-02-21 | End: 2025-02-21

## 2025-02-21 RX ORDER — ONDANSETRON 4 MG/1
4 TABLET, ORALLY DISINTEGRATING ORAL EVERY 8 HOURS PRN
Qty: 20 TABLET | Refills: 0 | Status: SHIPPED | OUTPATIENT
Start: 2025-02-21

## 2025-02-21 RX ORDER — ONDANSETRON 2 MG/ML
4 INJECTION INTRAMUSCULAR; INTRAVENOUS ONCE
Status: COMPLETED | OUTPATIENT
Start: 2025-02-21 | End: 2025-02-21

## 2025-02-21 RX ORDER — SODIUM CHLORIDE 9 MG/ML
1000 INJECTION, SOLUTION INTRAVENOUS ONCE
Status: COMPLETED | OUTPATIENT
Start: 2025-02-21 | End: 2025-02-21

## 2025-02-21 RX ORDER — POLYETHYLENE GLYCOL 3350 17 G/17G
17 POWDER, FOR SOLUTION ORAL DAILY
Qty: 24 EACH | Refills: 0 | Status: SHIPPED | OUTPATIENT
Start: 2025-02-21

## 2025-02-21 RX ADMIN — ONDANSETRON 4 MG: 2 INJECTION INTRAMUSCULAR; INTRAVENOUS at 09:04

## 2025-02-21 RX ADMIN — ACETAMINOPHEN 1000 MG: 500 TABLET ORAL at 10:21

## 2025-02-21 RX ADMIN — SODIUM CHLORIDE 1000 ML: 9 INJECTION, SOLUTION INTRAVENOUS at 09:04

## 2025-02-21 ASSESSMENT — PAIN DESCRIPTION - PAIN TYPE
TYPE: ACUTE PAIN
TYPE: ACUTE PAIN

## 2025-02-21 ASSESSMENT — FIBROSIS 4 INDEX: FIB4 SCORE: 0.3

## 2025-02-21 NOTE — ED PROVIDER NOTES
ER Provider Note    Scribed for Adilson Mcnally M.D. by Tirso Ovalle. 2/21/2025   8:50 AM    Primary Care Provider: GINA Mckay    CHIEF COMPLAINT  Chief Complaint   Patient presents with    Abdominal Pain    Pregnancy     EXTERNAL RECORDS REVIEWED  Inpatient Notes Patient was hospitalized on 12/21/23 for labor. No other pertinent records to review.    HPI/ROS  LIMITATION TO HISTORY   Select: : None  OUTSIDE HISTORIAN(S):  Significant other  is present.    Shauna Sloan is a 29 y.o. female who presents to the ED for evaluation of abdominal pain onset today. Patient notes her abdominal pain is focused in her right lower quadrant. She describes her pain as initially constant last night; however, currently in the ED, the patient describes her pain as waxing and waning. She specifies that he pain does not feel like contractions. Patient explains she is pregnant and is seen by [facility]. Patient adds having an ultrasound conducted that confirmed the fetus was in the correct position. She notes her last menstrual period took place on December 14. She reports associated nausea and constipation but denies any dysuria, burning sensations while urinating or fever. Regarding her decreased bowel movements, patient has been experiencing movements once every two days. She elaborates by saying her typical bowel movement is once per day. She also mentions having a gastric lift conducted three years ago. She denies any surgical history of appendectomy or cholecystectomy. Patient says she has been hydrating well. No alleviating or exacerbating factors were noted.    PAST MEDICAL HISTORY  Past Medical History:   Diagnosis Date    Sleep apnea     +CPAP       SURGICAL HISTORY  History reviewed. No pertinent surgical history.    FAMILY HISTORY  Family History   Problem Relation Age of Onset    Other Sister         Hep B     Diabetes Maternal Aunt     Diabetes Maternal Uncle     Diabetes Paternal Aunt  "    Diabetes Paternal Uncle     Diabetes Maternal Grandmother     Diabetes Maternal Grandfather     Diabetes Paternal Grandmother     Diabetes Paternal Grandfather        SOCIAL HISTORY   reports that she quit smoking about 5 years ago. Her smoking use included cigarettes. She has never used smokeless tobacco. She reports that she does not currently use alcohol. She reports that she does not currently use drugs after having used the following drugs: Inhaled.    CURRENT MEDICATIONS  Discharge Medication List as of 2/21/2025 11:58 AM        CONTINUE these medications which have NOT CHANGED    Details   acetaminophen (TYLENOL) 500 MG Tab Take 2 Tablets by mouth every 6 hours as needed for Mild Pain or Moderate Pain., Disp-30 Tablet, R-0, Normal      docusate sodium 100 MG Cap Take 100 mg by mouth 2 times a day as needed (constipation)., Disp-60 Capsule, R-0, Normal      ibuprofen (MOTRIN) 800 MG Tab Take 1 Tablet by mouth every 8 hours as needed for Mild Pain or Moderate Pain., Disp-30 Tablet, R-0, Normal      ferrous sulfate 325 (65 Fe) MG tablet Take 1 Tablet by mouth every day., Disp-60 Tablet, R-0, Normal      PRENATAL VIT-DOCUSATE-IRON-FA PO Take  by mouth., Historical Med             ALLERGIES  No Known Allergies     PHYSICAL EXAM  BP (!) 128/93   Pulse 76   Temp 36.8 °C (98.2 °F) (Temporal)   Resp 18   Ht 1.727 m (5' 8\")   Wt 81.1 kg (178 lb 12.7 oz)   LMP 12/14/2024 (Approximate)   SpO2 97%   Breastfeeding Yes   BMI 27.19 kg/m²    Constitutional: Well developed, Well nourished, Mild distress,    HENT: Normocephalic, Atraumatic, Dry mucus membranes,   Eyes: PERRLA, EOMI, Conjunctiva normal, No discharge.   Neck: No tenderness, Supple, No stridor.   Cardiovascular: Normal heart rate, Normal rhythm.   Thorax & Lungs: Clear to auscultation bilaterally, No respiratory distress.   Abdomen: Tenderness across abdomen more focal on right lower quadrant, Soft, No masses.   Skin: Warm, Dry, No rash.  "   Musculoskeletal: No major deformities noted.  Neurologic: Awake, alert. Moves all extremities spontaneously.  Psychiatric: Affect normal, Judgment normal, Mood normal.      DIAGNOSTIC STUDIES    Labs:   Results for orders placed or performed during the hospital encounter of 02/21/25   COMP METABOLIC PANEL    Collection Time: 02/21/25  8:42 AM   Result Value Ref Range    Sodium 135 135 - 145 mmol/L    Potassium 3.9 3.6 - 5.5 mmol/L    Chloride 104 96 - 112 mmol/L    Co2 20 20 - 33 mmol/L    Anion Gap 11.0 7.0 - 16.0    Glucose 83 65 - 99 mg/dL    Bun 7 (L) 8 - 22 mg/dL    Creatinine 0.54 0.50 - 1.40 mg/dL    Calcium 8.6 8.5 - 10.5 mg/dL    Correct Calcium 8.8 8.5 - 10.5 mg/dL    AST(SGOT) 17 12 - 45 U/L    ALT(SGPT) 8 2 - 50 U/L    Alkaline Phosphatase 65 30 - 99 U/L    Total Bilirubin 0.3 0.1 - 1.5 mg/dL    Albumin 3.8 3.2 - 4.9 g/dL    Total Protein 6.7 6.0 - 8.2 g/dL    Globulin 2.9 1.9 - 3.5 g/dL    A-G Ratio 1.3 g/dL   LIPASE    Collection Time: 02/21/25  8:42 AM   Result Value Ref Range    Lipase 38 11 - 82 U/L   HCG QUANTITATIVE    Collection Time: 02/21/25  8:42 AM   Result Value Ref Range    Bhcg 459096.0 (H) 0.0 - 5.0 mIU/mL   RH Type for Rhogam from E.D.    Collection Time: 02/21/25  8:42 AM   Result Value Ref Range    Emergency Department Rh Typing NEG     Number Of Rh Doses Indicated 1    CBC WITH DIFFERENTIAL    Collection Time: 02/21/25  8:42 AM   Result Value Ref Range    WBC 7.2 4.8 - 10.8 K/uL    RBC 4.14 (L) 4.20 - 5.40 M/uL    Hemoglobin 11.2 (L) 12.0 - 16.0 g/dL    Hematocrit 34.1 (L) 37.0 - 47.0 %    MCV 82.4 81.4 - 97.8 fL    MCH 27.1 27.0 - 33.0 pg    MCHC 32.8 32.2 - 35.5 g/dL    RDW 40.2 35.9 - 50.0 fL    Platelet Count 377 164 - 446 K/uL    MPV 9.1 9.0 - 12.9 fL    Neutrophils-Polys 60.60 44.00 - 72.00 %    Lymphocytes 30.00 22.00 - 41.00 %    Monocytes 5.90 0.00 - 13.40 %    Eosinophils 2.20 0.00 - 6.90 %    Basophils 1.00 0.00 - 1.80 %    Immature Granulocytes 0.30 0.00 - 0.90 %     Nucleated RBC 0.00 0.00 - 0.20 /100 WBC    Neutrophils (Absolute) 4.39 1.82 - 7.42 K/uL    Lymphs (Absolute) 2.17 1.00 - 4.80 K/uL    Monos (Absolute) 0.43 0.00 - 0.85 K/uL    Eos (Absolute) 0.16 0.00 - 0.51 K/uL    Baso (Absolute) 0.07 0.00 - 0.12 K/uL    Immature Granulocytes (abs) 0.02 0.00 - 0.11 K/uL    NRBC (Absolute) 0.00 K/uL   ESTIMATED GFR    Collection Time: 02/21/25  8:42 AM   Result Value Ref Range    GFR (CKD-EPI) 128 >60 mL/min/1.73 m 2   URINALYSIS,CULTURE IF INDICATED    Collection Time: 02/21/25 10:45 AM    Specimen: Urine   Result Value Ref Range    Color Yellow     Character Clear     Specific Gravity 1.013 <1.035    Ph 5.5 5.0 - 8.0    Glucose Negative Negative mg/dL    Ketones Trace (A) Negative mg/dL    Protein Negative Negative mg/dL    Bilirubin Negative Negative    Urobilinogen, Urine 0.2 <=1.0 EU/dL    Nitrite Negative Negative    Leukocyte Esterase Negative Negative    Occult Blood Negative Negative    Micro Urine Req see below        Radiology:   This attending emergency physician has independently interpreted the diagnostic imaging associated with this visit and is awaiting the final reading from the radiologist.   Preliminary interpretation is a follows: [facility]  Radiologist interpretation:   US-OB 1ST TRIMESTER WITH TRANSVAGINAL (COMBO)   Final Result      1.  Single living intrauterine pregnancy at 9 weeks, 2 days estimated gestational age.   2.  No evidence of ovarian torsion.   3.  Small/moderate subchorionic hemorrhage.      US-APPENDIX   Final Result      Although no ancillary findings of acute appendicitis are identified, the appendix is not visualized. Acute appendicitis cannot definitely be excluded. Clinical correlation is recommended.            COURSE & MEDICAL DECISION MAKING     Hydration: Based on the patient's presentation of Dehydration the patient was given IV fluids. IV Hydration was used because oral hydration was not adequate alone. Upon recheck following hydration,  the patient was improved.    INITIAL ASSESSMENT, COURSE AND PLAN  Differential diagnoses include but not limited to: Constipation, appendicitis, pregnancy pain, or urinary tract infection.     Care Narrative: Right lower quadrant abdominal pain of uncertain etiology.  The patient is pregnant, has had a previous ultrasound that showed an IUP.  The patient denies any fevers but does have nausea.  Give the patient nausea medicines, IV fluids for dry mucous membranes with improvement the patient's symptoms.Unable to get a CT due to pregnancy.  Ultrasound of the appendix did not evaluate appendix, the patient has no leukocytosis, repeat abdominal examination is benign.  At this time I do not know the etiology of the patient's right lower quadrant abdominal pain.  Possibly early appendicitis versus constipation versus pregnancy related.  I do not see any evidence of urinary tract infection.  The patient will take Tylenol for pain.  Discussed with her to use MiraLAX over-the-counter for constipation, drink plenty of fluids, return in the next 1 to 2 days if not improved, return sooner with increasing pain fevers vomiting or any other concerns.    8:50 AM - Patient was evaluated at bedside. I explained plan of care, including labs and imaging. She agrees to plan of care. Ordered for CBC w/ Diff, RH Type, UA, HCG Quantitative, Lipase, CMP, [id number] and US-Appendix to evaluate. The patient will be medicated with Zofran 4 mg and NS Bolus 1,000 mL for her symptoms. Patient verbalizes understanding and support with my plan of care.     11:34 AM - Patient was reevaluated at bedside. Discussed lab and radiology results with the patient and informed them their results were reassuring. I discussed plan for discharge and follow up as outlined below. The patient is stable for discharge at this time and will return for any new or worsening symptoms. Patient verbalizes understanding and support with my plan for  discharge.    DISPOSITION AND DISCUSSIONS    I have discussed management of the patient with the following physicians and NOMAN's:  None    Discussion of management with other [facility] or appropriate source(s): None     Escalation of care considered, and ultimately not performed: diagnostic imaging.    Barriers to care at this time, including but not limited to:  None known at this time .     The patient will return for new or worsening symptoms and is stable at the time of discharge.    DISPOSITION:  Patient will be discharged home in stable condition.    FOLLOW UP:  [facility], Emergency Dept  [facility] [facility] [address] [phone]    If symptoms worsen, In 1-2 days for recheck if not improved.      OUTPATIENT MEDICATIONS:  Discharge Medication List as of 2/21/2025 11:58 AM        START taking these medications    Details   ondansetron (ZOFRAN ODT) 4 MG TABLET DISPERSIBLE Take 1 Tablet by mouth every 8 hours as needed for Nausea/Vomiting., Disp-20 Tablet, R-0, Normal      polyethylene glycol/lytes (MIRALAX) Pack Take 1 Packet by mouth every day., Disp-24 Each, R-0, Normal              FINAL DIAGNOSIS  1. Right lower quadrant abdominal pain    2. 9 weeks gestation of pregnancy       Tirso REYES (Scribe), am scribing for, and in the presence of, Adilson Mcnally M.D..    Electronically signed by: Tirso Ovalle (Adrian), 2/21/2025    Adilson REYES M.D. personally performed the services described in this documentation, as scribed by Tirso Ovalle in my presence, and it is both accurate and complete.      The note accurately reflects work and decisions made by me.  Adilson Mcnally M.D.  2/21/2025  5:07 PM

## 2025-02-21 NOTE — ED TRIAGE NOTES
Pt ambulated into triage with c/o lower abdominal pain. Pt sts she is pregnant and believes her last cycle was 12/14/24. Pt sts the pain started last night and became worse. Denies vaginal bleeding. Protocol orders placed. Pt A&O and ambulatory, placed in lobby with family pending room.

## 2025-02-21 NOTE — ED NOTES
"Shauna Sloan has been discharged from the Emergency Room.    IV discontinued and gauze bandage placed, pt in possession of belongings.    Discharge instructions, which include signs and symptoms to monitor patient for, as well as detailed information regarding LRQ abd pain and pregnancy provided.  Patient verbalizes understanding of follow up care and medication management. All questions and concerns addressed at this time.     Patient provided with education on when to return to the ER and verbally understands with no concerns. Patient advised on setting up MyChart and information provided about patient survey.  Patient leaves ER in no apparent distress. This RN provided education regarding returning to the ER for any new concerns or changes in patient's condition.      BP 97/53   Pulse 60   Temp 36.8 °C (98.2 °F) (Temporal)   Resp 17   Ht 1.727 m (5' 8\")   Wt 81.1 kg (178 lb 12.7 oz)   LMP 12/14/2024 (Approximate)   SpO2 98%   Breastfeeding Yes   BMI 27.19 kg/m²    "

## 2025-04-14 DIAGNOSIS — O21.0 HYPEREMESIS GRAVIDARUM: ICD-10-CM

## 2025-04-14 RX ORDER — ONDANSETRON 2 MG/ML
4 INJECTION INTRAMUSCULAR; INTRAVENOUS ONCE
Start: 2025-04-21 | End: 2025-04-21

## 2025-08-20 ENCOUNTER — HOSPITAL ENCOUNTER (EMERGENCY)
Facility: MEDICAL CENTER | Age: 30
End: 2025-08-20
Attending: SPECIALIST | Admitting: SPECIALIST
Payer: MEDICAID

## 2025-08-20 VITALS
RESPIRATION RATE: 12 BRPM | SYSTOLIC BLOOD PRESSURE: 125 MMHG | WEIGHT: 215.83 LBS | OXYGEN SATURATION: 97 % | HEIGHT: 66 IN | DIASTOLIC BLOOD PRESSURE: 58 MMHG | HEART RATE: 82 BPM | TEMPERATURE: 97.7 F | BODY MASS INDEX: 34.69 KG/M2

## 2025-08-20 PROCEDURE — 59025 FETAL NON-STRESS TEST: CPT

## 2025-08-20 PROCEDURE — 302449 STATCHG TRIAGE ONLY (STATISTIC)

## 2025-08-20 ASSESSMENT — FIBROSIS 4 INDEX: FIB4 SCORE: 0.46
